# Patient Record
Sex: FEMALE | Race: WHITE
[De-identification: names, ages, dates, MRNs, and addresses within clinical notes are randomized per-mention and may not be internally consistent; named-entity substitution may affect disease eponyms.]

---

## 2018-12-23 ENCOUNTER — HOSPITAL ENCOUNTER (EMERGENCY)
Dept: HOSPITAL 91 - FTE | Age: 43
LOS: 1 days | Discharge: HOME | End: 2018-12-24
Payer: SELF-PAY

## 2018-12-23 ENCOUNTER — HOSPITAL ENCOUNTER (EMERGENCY)
Age: 43
LOS: 1 days | Discharge: HOME | End: 2018-12-24

## 2018-12-23 DIAGNOSIS — N30.00: Primary | ICD-10-CM

## 2018-12-23 PROCEDURE — 83605 ASSAY OF LACTIC ACID: CPT

## 2018-12-23 PROCEDURE — 81025 URINE PREGNANCY TEST: CPT

## 2018-12-23 PROCEDURE — 81001 URINALYSIS AUTO W/SCOPE: CPT

## 2018-12-23 PROCEDURE — 99284 EMERGENCY DEPT VISIT MOD MDM: CPT

## 2018-12-23 PROCEDURE — 96374 THER/PROPH/DIAG INJ IV PUSH: CPT

## 2018-12-23 PROCEDURE — 85025 COMPLETE CBC W/AUTO DIFF WBC: CPT

## 2018-12-23 PROCEDURE — 36415 COLL VENOUS BLD VENIPUNCTURE: CPT

## 2018-12-23 PROCEDURE — 87086 URINE CULTURE/COLONY COUNT: CPT

## 2018-12-23 PROCEDURE — 80053 COMPREHEN METABOLIC PANEL: CPT

## 2018-12-23 PROCEDURE — 87040 BLOOD CULTURE FOR BACTERIA: CPT

## 2018-12-23 PROCEDURE — 96375 TX/PRO/DX INJ NEW DRUG ADDON: CPT

## 2018-12-23 RX ADMIN — THIAMINE HYDROCHLORIDE 1 ML: 100 INJECTION, SOLUTION INTRAMUSCULAR; INTRAVENOUS at 23:57

## 2018-12-23 RX ADMIN — ACETAMINOPHEN 1 MG: 325 TABLET, FILM COATED ORAL at 23:57

## 2018-12-24 ENCOUNTER — HOSPITAL ENCOUNTER (INPATIENT)
Dept: HOSPITAL 91 - FTE | Age: 43
LOS: 5 days | Discharge: HOME | DRG: 872 | End: 2018-12-29
Payer: MEDICAID

## 2018-12-24 ENCOUNTER — HOSPITAL ENCOUNTER (INPATIENT)
Dept: HOSPITAL 10 - FTE | Age: 43
LOS: 5 days | Discharge: HOME | DRG: 872 | End: 2018-12-29
Attending: INTERNAL MEDICINE
Payer: MEDICAID

## 2018-12-24 VITALS — SYSTOLIC BLOOD PRESSURE: 100 MMHG | DIASTOLIC BLOOD PRESSURE: 55 MMHG | RESPIRATION RATE: 18 BRPM

## 2018-12-24 VITALS
HEIGHT: 64 IN | WEIGHT: 207.68 LBS | BODY MASS INDEX: 35.45 KG/M2 | HEIGHT: 64 IN | BODY MASS INDEX: 35.45 KG/M2 | WEIGHT: 207.68 LBS

## 2018-12-24 DIAGNOSIS — E87.6: ICD-10-CM

## 2018-12-24 DIAGNOSIS — J01.90: ICD-10-CM

## 2018-12-24 DIAGNOSIS — E86.0: ICD-10-CM

## 2018-12-24 DIAGNOSIS — A41.50: Primary | ICD-10-CM

## 2018-12-24 DIAGNOSIS — E66.9: ICD-10-CM

## 2018-12-24 DIAGNOSIS — R51: ICD-10-CM

## 2018-12-24 DIAGNOSIS — R65.20: ICD-10-CM

## 2018-12-24 DIAGNOSIS — K81.0: ICD-10-CM

## 2018-12-24 DIAGNOSIS — N10: ICD-10-CM

## 2018-12-24 LAB
ADD MAN DIFF?: NO
ADD MAN DIFF?: NO
ADD UMIC: YES
ALANINE AMINOTRANSFERASE: 25 IU/L (ref 13–69)
ALANINE AMINOTRANSFERASE: 29 IU/L (ref 13–69)
ALBUMIN/GLOBULIN RATIO: 1.02
ALBUMIN/GLOBULIN RATIO: 1.4
ALBUMIN: 3.5 G/DL (ref 3.3–4.9)
ALBUMIN: 4.2 G/DL (ref 3.3–4.9)
ALKALINE PHOSPHATASE: 101 IU/L (ref 42–121)
ALKALINE PHOSPHATASE: 80 IU/L (ref 42–121)
ANION GAP: 13 (ref 5–13)
ANION GAP: 9 (ref 5–13)
ASPARTATE AMINO TRANSFERASE: 28 IU/L (ref 15–46)
ASPARTATE AMINO TRANSFERASE: 30 IU/L (ref 15–46)
BASOPHIL #: 0 10^3/UL (ref 0–0.1)
BASOPHIL #: 0 10^3/UL (ref 0–0.1)
BASOPHILS %: 0.1 % (ref 0–2)
BASOPHILS %: 0.2 % (ref 0–2)
BILIRUBIN,DIRECT: 0 MG/DL (ref 0–0.2)
BILIRUBIN,DIRECT: 0 MG/DL (ref 0–0.2)
BILIRUBIN,TOTAL: 0.6 MG/DL (ref 0.2–1.3)
BILIRUBIN,TOTAL: 0.7 MG/DL (ref 0.2–1.3)
BLOOD UREA NITROGEN: 12 MG/DL (ref 7–20)
BLOOD UREA NITROGEN: 6 MG/DL (ref 7–20)
CALCIUM: 8.5 MG/DL (ref 8.4–10.2)
CALCIUM: 9.1 MG/DL (ref 8.4–10.2)
CARBON DIOXIDE: 28 MMOL/L (ref 21–31)
CARBON DIOXIDE: 28 MMOL/L (ref 21–31)
CHLORIDE: 104 MMOL/L (ref 97–110)
CHLORIDE: 97 MMOL/L (ref 97–110)
CREATININE: 0.63 MG/DL (ref 0.44–1)
CREATININE: 0.65 MG/DL (ref 0.44–1)
EOSINOPHILS #: 0 10^3/UL (ref 0–0.5)
EOSINOPHILS #: 0 10^3/UL (ref 0–0.5)
EOSINOPHILS %: 0.2 % (ref 0–7)
EOSINOPHILS %: 0.3 % (ref 0–7)
GLOBULIN: 3 G/DL (ref 1.3–3.2)
GLOBULIN: 3.4 G/DL (ref 1.3–3.2)
GLUCOSE: 147 MG/DL (ref 70–220)
GLUCOSE: 170 MG/DL (ref 70–220)
HEMATOCRIT: 32.7 % (ref 37–47)
HEMATOCRIT: 35.1 % (ref 37–47)
HEMOGLOBIN: 10.8 G/DL (ref 12–16)
HEMOGLOBIN: 12.1 G/DL (ref 12–16)
IMMATURE GRANS #M: 0.04 10^3/UL (ref 0–0.03)
IMMATURE GRANS #M: 0.08 10^3/UL (ref 0–0.03)
IMMATURE GRANS % (M): 0.4 % (ref 0–0.43)
IMMATURE GRANS % (M): 0.7 % (ref 0–0.43)
LIPASE: 10 U/L (ref 23–300)
LYMPHOCYTES #: 0.7 10^3/UL (ref 0.8–2.9)
LYMPHOCYTES #: 1 10^3/UL (ref 0.8–2.9)
LYMPHOCYTES %: 10.3 % (ref 15–51)
LYMPHOCYTES %: 6.7 % (ref 15–51)
MEAN CORPUSCULAR HEMOGLOBIN: 30.1 PG (ref 29–33)
MEAN CORPUSCULAR HEMOGLOBIN: 30.3 PG (ref 29–33)
MEAN CORPUSCULAR HGB CONC: 33 G/DL (ref 32–37)
MEAN CORPUSCULAR HGB CONC: 34.5 G/DL (ref 32–37)
MEAN CORPUSCULAR VOLUME: 87.8 FL (ref 82–101)
MEAN CORPUSCULAR VOLUME: 91.1 FL (ref 82–101)
MEAN PLATELET VOLUME: 10.5 FL (ref 7.4–10.4)
MEAN PLATELET VOLUME: 9.9 FL (ref 7.4–10.4)
MONOCYTE #: 0.3 10^3/UL (ref 0.3–0.9)
MONOCYTE #: 0.6 10^3/UL (ref 0.3–0.9)
MONOCYTES %: 3.7 % (ref 0–11)
MONOCYTES %: 5.6 % (ref 0–11)
NEUTROPHIL #: 7.9 10^3/UL (ref 1.6–7.5)
NEUTROPHIL #: 9.4 10^3/UL (ref 1.6–7.5)
NEUTROPHILS %: 85.2 % (ref 39–77)
NEUTROPHILS %: 86.6 % (ref 39–77)
NUCLEATED RED BLOOD CELLS #: 0 10^3/UL (ref 0–0)
NUCLEATED RED BLOOD CELLS #: 0 10^3/UL (ref 0–0)
NUCLEATED RED BLOOD CELLS%: 0 /100WBC (ref 0–0)
NUCLEATED RED BLOOD CELLS%: 0 /100WBC (ref 0–0)
PLATELET COUNT: 131 10^3/UL (ref 140–415)
PLATELET COUNT: 140 10^3/UL (ref 140–415)
POTASSIUM: 3.2 MMOL/L (ref 3.5–5.1)
POTASSIUM: 3.5 MMOL/L (ref 3.5–5.1)
RED BLOOD COUNT: 3.59 10^6/UL (ref 4.2–5.4)
RED BLOOD COUNT: 4 10^6/UL (ref 4.2–5.4)
RED CELL DISTRIBUTION WIDTH: 12.2 % (ref 11.5–14.5)
RED CELL DISTRIBUTION WIDTH: 12.8 % (ref 11.5–14.5)
SODIUM: 138 MMOL/L (ref 135–144)
SODIUM: 141 MMOL/L (ref 135–144)
TOTAL PROTEIN: 6.9 G/DL (ref 6.1–8.1)
TOTAL PROTEIN: 7.2 G/DL (ref 6.1–8.1)
UR ASCORBIC ACID: NEGATIVE MG/DL
UR BACTERIA: (no result) /HPF
UR BILIRUBIN (DIP): NEGATIVE MG/DL
UR BLOOD (DIP): (no result) MG/DL
UR CLARITY: (no result)
UR COLOR: YELLOW
UR GLUCOSE (DIP): NEGATIVE MG/DL
UR KETONES (DIP): (no result) MG/DL
UR LEUKOCYTE ESTERASE (DIP): (no result) LEU/UL
UR NITRITE (DIP): NEGATIVE MG/DL
UR NONSQUAMOUS EPITHELIAL CELL: 2 /HPF
UR PH (DIP): 7 (ref 5–9)
UR RBC: 13 /HPF (ref 0–5)
UR SPECIFIC GRAVITY (DIP): 1.01 (ref 1–1.03)
UR SQUAMOUS EPITHELIAL CELL: (no result) /HPF
UR TOTAL PROTEIN (DIP): (no result) MG/DL
UR UROBILINOGEN (DIP): (no result) MG/DL
UR WBC: 98 /HPF (ref 0–5)
WHITE BLOOD COUNT: 10.8 10^3/UL (ref 4.8–10.8)
WHITE BLOOD COUNT: 9.3 10^3/UL (ref 4.8–10.8)

## 2018-12-24 PROCEDURE — 90686 IIV4 VACC NO PRSV 0.5 ML IM: CPT

## 2018-12-24 PROCEDURE — 80069 RENAL FUNCTION PANEL: CPT

## 2018-12-24 PROCEDURE — 87086 URINE CULTURE/COLONY COUNT: CPT

## 2018-12-24 PROCEDURE — 84479 ASSAY OF THYROID (T3 OR T4): CPT

## 2018-12-24 PROCEDURE — 80061 LIPID PANEL: CPT

## 2018-12-24 PROCEDURE — 96375 TX/PRO/DX INJ NEW DRUG ADDON: CPT

## 2018-12-24 PROCEDURE — 81001 URINALYSIS AUTO W/SCOPE: CPT

## 2018-12-24 PROCEDURE — 83690 ASSAY OF LIPASE: CPT

## 2018-12-24 PROCEDURE — 96374 THER/PROPH/DIAG INJ IV PUSH: CPT

## 2018-12-24 PROCEDURE — 84436 ASSAY OF TOTAL THYROXINE: CPT

## 2018-12-24 PROCEDURE — A9537 TC99M MEBROFENIN: HCPCS

## 2018-12-24 PROCEDURE — 87040 BLOOD CULTURE FOR BACTERIA: CPT

## 2018-12-24 PROCEDURE — 71045 X-RAY EXAM CHEST 1 VIEW: CPT

## 2018-12-24 PROCEDURE — 85025 COMPLETE CBC W/AUTO DIFF WBC: CPT

## 2018-12-24 PROCEDURE — 83735 ASSAY OF MAGNESIUM: CPT

## 2018-12-24 PROCEDURE — 80053 COMPREHEN METABOLIC PANEL: CPT

## 2018-12-24 PROCEDURE — 99285 EMERGENCY DEPT VISIT HI MDM: CPT

## 2018-12-24 PROCEDURE — 78226 HEPATOBILIARY SYSTEM IMAGING: CPT

## 2018-12-24 PROCEDURE — 70470 CT HEAD/BRAIN W/O & W/DYE: CPT

## 2018-12-24 PROCEDURE — 84443 ASSAY THYROID STIM HORMONE: CPT

## 2018-12-24 PROCEDURE — 82977 ASSAY OF GGT: CPT

## 2018-12-24 PROCEDURE — 83036 HEMOGLOBIN GLYCOSYLATED A1C: CPT

## 2018-12-24 PROCEDURE — 76705 ECHO EXAM OF ABDOMEN: CPT

## 2018-12-24 PROCEDURE — G0378 HOSPITAL OBSERVATION PER HR: HCPCS

## 2018-12-24 PROCEDURE — 74176 CT ABD & PELVIS W/O CONTRAST: CPT

## 2018-12-24 RX ADMIN — ONDANSETRON PRN MG: 4 TABLET, FILM COATED ORAL at 20:57

## 2018-12-24 RX ADMIN — THIAMINE HYDROCHLORIDE 1 MLS/HR: 100 INJECTION, SOLUTION INTRAMUSCULAR; INTRAVENOUS at 20:58

## 2018-12-24 RX ADMIN — CEFTRIAXONE 1 MLS/HR: 1 INJECTION, SOLUTION INTRAVENOUS at 01:15

## 2018-12-24 RX ADMIN — POTASSIUM CHLORIDE 1 MEQ: 1500 TABLET, EXTENDED RELEASE ORAL at 01:15

## 2018-12-24 RX ADMIN — CEFTRIAXONE 1 MLS/HR: 1 INJECTION, SOLUTION INTRAVENOUS at 18:17

## 2018-12-24 RX ADMIN — ONDANSETRON HYDROCHLORIDE 1 MG: 4 TABLET, FILM COATED ORAL at 20:57

## 2018-12-24 RX ADMIN — ONDANSETRON HYDROCHLORIDE 1 MG: 2 INJECTION, SOLUTION INTRAMUSCULAR; INTRAVENOUS at 03:00

## 2018-12-24 RX ADMIN — ONDANSETRON HYDROCHLORIDE 1 MG: 2 INJECTION, SOLUTION INTRAMUSCULAR; INTRAVENOUS at 18:17

## 2018-12-24 RX ADMIN — FOLIC ACID SCH MLS/HR: 5 INJECTION, SOLUTION INTRAMUSCULAR; INTRAVENOUS; SUBCUTANEOUS at 20:58

## 2018-12-24 RX ADMIN — HYDROCODONE BITARTRATE AND ACETAMINOPHEN 1 TAB: 5; 325 TABLET ORAL at 20:57

## 2018-12-24 RX ADMIN — KETOROLAC TROMETHAMINE 1 MG: 30 INJECTION, SOLUTION INTRAMUSCULAR at 01:36

## 2018-12-24 RX ADMIN — THIAMINE HYDROCHLORIDE 1 MLS/HR: 100 INJECTION, SOLUTION INTRAMUSCULAR; INTRAVENOUS at 18:17

## 2018-12-24 RX ADMIN — HYDROCODONE BITARTRATE AND ACETAMINOPHEN PRN TAB: 5; 325 TABLET ORAL at 20:57

## 2018-12-24 NOTE — ERD
ER Documentation


Chief Complaint


Chief Complaint





Pt called by [facility] for lab results, seen yesterday





HPI


43-year-old female history of remote appendectomy was evaluated in the ED 


earlier this morning diagnosed with pyelonephritis treated with Rocephin and 


discharged on ciprofloxacin called back today for evaluation of blood cultures 


which were positive for gram-negative rods.  Patient complains of ongoing 


fevers, right flank pain and mild, generalized headache.  Denies neck or back 


pain.  Denies dysuria, polyuria or hematuria.  Denies chest pain, palpitations, 


cough or shortness of breath.  No relieving or exacerbating factors.





ROS


All systems reviewed and are negative except as per history of present illness.





Medications


Home Meds


Active Scripts


Hydrocodone Bit-Acetaminophen (Hydrocodone Bit-APAP) 5-325MG Tablet, 1 TAB PO 


Q6H PRN for MODERATE PAIN LEVEL 4-6 for 7 Days, #28 TAB


   Prov:MITCHELL KRAUSE MD         18


Simethicone (GAS RELIEF) 80 Mg Tab.chew, 80 MG PO Q6 PRN for 


DISTENSION/GAS/BLOATING for 30 Days, #120 TAB.CHEW


   Prov:MITCHELL KRAUSE MD         18


Ondansetron (Ondansetron Odt) 4 Mg Tab.rapdis, 4 MG PO Q6H PRN for NAUSEA AND/OR


VOMITING for 7 Days, #30 TAB


   Prov:MITCHELL KRAUSE MD         18


Fluticasone Propionate* (Fluticasone Propionate* Nasal) 50 Mcg/Spray - 16 Gm 


[place].susp, 1 SPRAY NASAL BID for 14 Days, #1 BOT 6 Refills


   Prov:MITCHELL KRAUSE MD         18


Loratadine/Pseudoephedrine (Alavert D-12 Allergy-Sinus Tab) 1 Each Tab.er.12h, 1


TAB PO Q12 for 7 Days, #14 TAB


   Prov:MITCHELL KRAUSE MD         18


Ciprofloxacin Hcl* (Ciprofloxacin Hcl*) 500 Mg Tablet, 500 MG PO BID for 10 


Days, #20 TAB


   Prov:MITCHELL KRAUSE MD         18


Ibuprofen* (Motrin*) 600 Mg Tab, 600 MG PO Q6, #20 TAB


   Prov:ERICA KAUR MD         18


Discontinued Scripts


Hydrocodone Bit/Acetaminophen (Anexsia 5-325 Mg Tablet) 1 Tab Tablet, 2 TAB PO 


Q4 PRN for PAIN LEVEL 4-7, #20 TAB


   Prov:LEONARDO SAL NP         6/3/16





Allergies


Allergies:  


Coded Allergies:  


     No Known Allergy (Unverified , 18)





PMhx/Soc


Reviewed in chart. As per HPI.


History of Surgery:  Yes (just had laparoscopic appendectomy)


Anesthesia Reaction:  No


Hx Neurological Disorder:  No


Hx Respiratory Disorders:  No


Hx Cardiac Disorders:  No


Hx Psychiatric Problems:  No


Hx Miscellaneous Medical Probl:  No


Hx Alcohol Use:  No


Hx Substance Use:  No


Hx Tobacco Use:  No





FmHx


No family history relevant to presenting complaint





Physical Exam


Vitals


Temperature: 98.7.  Pulse: 98.  Respirations: 18.  Blood pressure: 124/73.  O2 


saturation 97% on room air.


Physical Exam


Const:   Alert, moderate distress


Head:   Atraumatic 


Eyes:    Normal Conjunctiva


ENT:    Normal External Ears, Nose and Mouth.


Neck:               Full range of motion. No meningismus.


Resp:   Clear to auscultation bilaterally


Cardio:   Regular rate and rhythm, no murmurs


Abd:    Soft, non tender, non distended.  No rebound or guarding.  Normal bowel 


sounds


Skin:   No petechiae or rashes


Back:   Right CVA tenderness.


Ext:    No cyanosis, or edema


Neur:   Awake and alert


Psych:    Normal Mood and Affect


Result Diagram:  


18 0616                                                                   


            18 0616





Results 24 hrs





Laboratory Tests


              Test
                                18
18:17


              White Blood Count                      9.3 10^3/ul


              Red Blood Count                       3.59 10^6/ul


              Hemoglobin                               10.8 g/dl


              Hematocrit                                  32.7 %


              Mean Corpuscular Volume                    91.1 fl


              Mean Corpuscular Hemoglobin                30.1 pg


              Mean Corpuscular Hemoglobin
Concent     33.0 g/dl 



              Red Cell Distribution Width                 12.8 %


              Platelet Count                         131 10^3/UL


              Mean Platelet Volume                        9.9 fl


              Immature Granulocytes %                    0.400 %


              Neutrophils %                               85.2 %


              Lymphocytes %                               10.3 %


              Monocytes %                                  3.7 %


              Eosinophils %                                0.3 %


              Basophils %                                  0.1 %


              Nucleated Red Blood Cells %            0.0 /100WBC


              Immature Granulocytes #              0.040 10^3/ul


              Neutrophils #                          7.9 10^3/ul


              Lymphocytes #                          1.0 10^3/ul


              Monocytes #                            0.3 10^3/ul


              Eosinophils #                          0.0 10^3/ul


              Basophils #                            0.0 10^3/ul


              Nucleated Red Blood Cells #            0.0 10^3/ul


              Sodium Level                            141 mmol/L


              Potassium Level                         3.5 mmol/L


              Chloride Level                          104 mmol/L


              Carbon Dioxide Level                     28 mmol/L


              Anion Gap                                        9


              Blood Urea Nitrogen                        6 mg/dl


              Creatinine                              0.63 mg/dl


              Est Glomerular Filtrat Rate
mL/min   > 60 mL/min 



              Glucose Level                            147 mg/dl


              Calcium Level                            8.5 mg/dl


              Total Bilirubin                          0.7 mg/dl


              Direct Bilirubin                        0.00 mg/dl


              Indirect Bilirubin                       0.7 mg/dl


              Aspartate Amino Transf
(AST/SGOT)         30 IU/L 



              Alanine Aminotransferase
(ALT/SGPT)       29 IU/L 



              Alkaline Phosphatase                       80 IU/L


              Total Protein                             6.9 g/dl


              Albumin                                   3.5 g/dl


              Globulin                                 3.40 g/dl


              Albumin/Globulin Ratio                        1.02


              Lipase                                      10 U/L





Current Medications


 Medications
   Dose
          Sig/Kathryn
       Start Time
   Status  Last


 (Trade)       Ordered        Route
 PRN     Stop Time              Admin
Dose


                              Reason                                Admin


 Sodium         1,000 ml @ 
   Q1H ONCE
      18      DC          18


Chloride       1,000 mls/hr   IV
            18:30
                       18:17



                                             18


                                             19:29


 Ceftriaxone    50 ml @ 
      ONCE  ONCE
    18      DC          18


Sodium         100 mls/hr     IVPB
          18:30
                       18:17



                                             18


                                             18:59


 Ondansetron    4 mg           ONCE  STAT
    18      DC          18


HCl
  (Zofran                 IV
            18:10
                       18:17



Inj)                                         18


                                             18:12


 Morphine       4 mg           ONCE  STAT
    18      DC          18


Sulfate
                      IV
            19:08
                       19:13



(morphine)                                   18


                                             19:09


 Sodium         1,000 ml @ 
   Q10H
 IV
      18      DC          18


Chloride       100 mls/hr                    19:39
                       04:38



                                             18


                                             15:38








Procedures/MDM


DOCUMENTS REVIEWED:  ED nurse, prior ED








MEDICAL DECISION MAKIN-year-old female history of remote appendectomy was 


evaluated in the ED earlier this morning diagnosed with pyelonephritis treated 


with Rocephin and discharged on ciprofloxacin called back today for evaluation 


of blood cultures which were positive for gram-negative rods.  CBC consistent 


with previously diagnosed anemia but no leukocytosis.  Chemistry reveals no 


renal insufficiency or electrolyte abnormalities.  Liver function tests are 


unremarkable.  Urinalysis reveals 11 WBCs per high-power field and 1+ leukocytes


but negative nitrite.  Patient with gram-negative bacteremia likely secondary to


pyelonephritis although considering her ongoing symptoms an occult intra-


abdominal etiology is also possible including cholecystitis and further imaging 


should be considered.  No respiratory symptoms or signs of pneumonia.  Gradual 


onset headache not consistent with subarachnoid hemorrhage, meningitis or 


encephalitis hence neuroimaging and lumbar puncture not indicated.  Admit to 


[facility] for intravenous antibiotics, further evaluation and management.











PATIENT CARE TRANSITIONED: Time: 19:20, Dr. Dueñas. 








Counseled patient and family regarding diagnosis, diagnostic results and plan 


for admission.





Departure


Diagnosis:  


   Primary Impression:  


   Gram-negative bacteremia


   Additional Impressions:  


   Pyelonephritis


   Headache


   Headache type:  unspecified  Headache chronicity pattern:  acute headache  


   Intractability:  not intractable  Qualified Codes:  R51 - Headache


Condition:  Serious











MARCOS GRIGSBY MD           Dec 24, 2018 19:20

## 2018-12-24 NOTE — HP
Date/Time of Note


Date/Time of Note


DATE: 12/24/18 


TIME: 19:52





Assessment/Plan


VTE Prophylaxis


SCD applied (from Nsg):  Yes


Pharmacological prophylaxis:  NA/contraindicated


Pharm contraindication:  low risk/ambulating





Lines/Catheters


IV Catheter Type (from Nrsg):  Saline Lock





Assessment/Plan


Hospital Course


This is a 43-year-old female being admitted to the [facility] floor for:





#1 right-sided pyelonephritis: Patient is CVA tenderness palpation.  At the 


current time she was also found to have gram-negative bacteremia she was given 


ceftriaxone IV in the ED at the current time I will continue her on ceftriaxone 


2 g IV every 24 hours.  Will await urine culture results.  Dilaudid for pain.  


Given the degree of the patient's pain and on and off duration for the last few 


months I will also order a CT of the abdomen pelvis without contrast to assess 


for any other underlying abnormalities such as possible kidney stone.





#2 gram-negative bacteremia: Currently on ceftriaxone, await culture results.





#3  Obesity: We will check hemoglobin A1c, lipid panel, TSH





#4 DVT GI prophylaxis: SCDs, no GI prophylaxis indicated





Further treatment strategy will be implemented as per the clinical course


Result Diagram:  


12/24/18 1817                                                                   


            12/24/18 1817





Results 24hrs





Laboratory Tests


              Test
                                12/24/18
18:17


              White Blood Count                             9.3


              Red Blood Count                             3.59  L


              Hemoglobin                                  10.8  L


              Hematocrit                                  32.7  L


              Mean Corpuscular Volume                      91.1


              Mean Corpuscular Hemoglobin                  30.1


              Mean Corpuscular Hemoglobin
Concent         33.0  



              Red Cell Distribution Width                  12.8


              Platelet Count                               131  L


              Mean Platelet Volume                          9.9


              Immature Granulocytes %                     0.400


              Neutrophils %                               85.2  H


              Lymphocytes %                               10.3  L


              Monocytes %                                   3.7


              Eosinophils %                                 0.3


              Basophils %                                   0.1


              Nucleated Red Blood Cells %                   0.0


              Immature Granulocytes #                    0.040  H


              Neutrophils #                                7.9  H


              Lymphocytes #                                 1.0


              Monocytes #                                   0.3


              Eosinophils #                                 0.0


              Basophils #                                   0.0


              Nucleated Red Blood Cells #                   0.0


              Sodium Level                                  141


              Potassium Level                               3.5


              Chloride Level                                104


              Carbon Dioxide Level                           28


              Anion Gap                                       9


              Blood Urea Nitrogen                            6  L


              Creatinine                                   0.63


              Est Glomerular Filtrat Rate
mL/min   > 60  



              Glucose Level                                 147


              Calcium Level                                 8.5


              Total Bilirubin                               0.7


              Direct Bilirubin                             0.00


              Indirect Bilirubin                            0.7


              Aspartate Amino Transf
(AST/SGOT)             30  



              Alanine Aminotransferase
(ALT/SGPT)           29  



              Alkaline Phosphatase                           80


              Total Protein                                 6.9


              Albumin                                       3.5


              Globulin                                    3.40  H


              Albumin/Globulin Ratio                       1.02


              Lipase                                        10  L








HPI/ROS


Admit Date/Time


Admit Date/Time








Hx of Present Illness


Chief complaint: Fever, flank pain, positive blood culture





This is a 43 year old female who was diagnosed with the last 24 hours of urinary


tract infection and was given IV Rocephin and sent home on ciprofloxacin.  


Patient was called back to [facility] after she had a positive


blood culture with gram-negative rods.  Patient at the current time reports 


headache as well as fevers.  She states that she continues to have right flank 


pain.  She denies any chest pain.  She does report urinary frequency.  Upon 


further questioning the patient does report that her right-sided flank pain has 


been on and off for the last few months.





Allergies: NKDA





Medications: None





ROS


Const: As per HPI


Eyes : No pain discharge or redness or change in visual acuity


ENT: No pain, sore throat, congestion, congestion,  dysphagia or discharge


Respiratory: No shortness of breath, cough, sputum, wheezing, or pleuritic pain


Cardiovascular: No chest pain, palpitation, PND, or edema


GI : no change in appetite, abdominal pain, nausea, vomiting, diarrhea, 


constipation, or change in the color his stool 


Genitourinary: As per HPI


Musculoskeletal: No joint pain, back pain, neck pain, restricted range of motion


in neck or joints


Skin: No rash, bruising or hives 


Neuro: No headache, dizziness, syncope, seizure, focal weakness


Endocrine: No polyuria, polydipsia, temperature intolerance


Psych: No hallucination, depression, anxiety or suicidal ideation





PMH/Family/Social


Past Medical History


Medical History:  no pertinent history


Medications





Current Medications


Sodium Chloride 1,000 ml @  100 mls/hr Q10H IV ;  Start 12/24/18 at 19:39;  Stop


12/25/18 at 15:38;  Status UNV


IV Flush (NS 3 ml) 3 ml PER PROTOCOL IV ;  Start 12/24/18 at 20:00;  Status UNV


Ondansetron HCl (Zofran Tab) 4 mg Q6H  PRN PO NAUSEA AND/OR VOMITING;  Start 


12/24/18 at 20:00;  Status UNV


Acetaminophen (Tylenol Tab) 650 mg Q6H  PRN PO PAIN LEVEL 1-3 OR FEVER;  Start 


12/24/18 at 20:00;  Status UNV


Acetaminophen/ Hydrocodone Bitart (Norco (5/325)) 1 tab Q6H  PRN PO MODERATE 


PAIN LEVEL 4-6;  Start 12/24/18 at 20:00;  Status UNV


Docusate Sodium (Colace) 100 mg Q12H  PRN PO CONSTIPATION;  Start 12/24/18 at 20


:00;  Status UNV


Bisacodyl (Dulcolax) 5 mg DAILY  PRN PO CONSTIPATION;  Start 12/24/18 at 20:00; 


Status UNV


Coded Allergies:  


     No Known Allergy (Unverified , 12/24/18)





Past Surgical History


Appendectomy?


Past Surgical Hx:  other





Family History


Significant Family History:  no pertinent family hx





Social History


Alcohol Use:  none





Exam/Review of Systems


Vital Signs


Vitals





Vital Signs


  Date      Temp  Pulse  Resp  B/P (MAP)   Pulse Ox  O2          O2 Flow    FiO2


Time                                                 Delivery    Rate


  12/24/18  98.7     98    18      124/73        97


     17:14                           (90)








Exam


Exam





General: Patient is a pleasant female currently lying in bed in moderate 


distress from flank pain 


HEENT: Atraumatic, normocephalic. The pupils are equal, round and reactive. 


Extraocular motor are intact


Neck: Supple with full range of motion. No rigidity or meningismus


Chest: Nontender


Lungs: Clear to auscultation bilaterally no crackles rales or wheezing


Heart: Normal S1-S2, Regular rhythm and rate. No murmur, S3, or S4


Abdomen: ight-sided flank tender to palpation,Soft , nontender,  nondistended , 


bowel sounds are present. No guarding no rebound tenderness , r


Extremities: Normal to inspection, no edema no cyanosis


Neurologic: Normal mental status, speech normal, cranial nerves II through XII 


are intact, motor and sensory are intact, no focal weakness


Additional Comments


PROCEDURE:   Portable chest x-ray. 


 


CLINICAL INDICATION: 43 years of age,  female. Right flank pain. UTI.


 


TECHNIQUE:   Portable AP view of the chest. 


 


COMPARISON:  CT abdomen pelvis June 2, 2016


 


FINDINGS:


 


Medical devices:  None.


 


Mediastinum:  Cardiomediastinal contours are normal.  


 


Lungs:  There is mild elevation of the right diaphragm that is unchanged from 


the prior CT. There is nonspecific mild increased opacity at the right greater 


than left lung bases. Lungs are otherwise clear.


 


Pleura:  Negative for pleural effusion or pneumothorax.


 


Bones:  No acute bony abnormality.   


 


Additional comment:  None.


 


IMPRESSION:


 


1. Mild elevation of the right diaphragm is unchanged since June 2, 2016. 


 


2. Nonspecific opacity at the right greater than left lung bases may be due to 


atelectasis, aspiration or pneumonia.


 


 


RPTAT: HCTS


_____________________________________________ 


Konrad Murillo Physician           Date    Time 


Electronically viewed and signed by Konrad Murillo Physician on 12/25/2018 


01:25 


 


D:  12/25/2018 01:25  T:  12/25/2018 01:25


CS/





CC: DALI GOTTI





[phone]











DALI GOTTI                 Dec 24, 2018 19:52

## 2018-12-24 NOTE — NUR
admitted patient from ER with daughter as . alert oriented x 4. oriented pt with 
room, phone and call button. also oriented pt with hourly rounding and encouraged to call 
for help at all times. skin assessment done with photos take. admission orders placed in by 
Dr Dueñas. noted and carried out. medicated with [place] for pain with help. will continue to 
monitor.

## 2018-12-25 VITALS — SYSTOLIC BLOOD PRESSURE: 119 MMHG | HEART RATE: 96 BPM | RESPIRATION RATE: 18 BRPM | DIASTOLIC BLOOD PRESSURE: 68 MMHG

## 2018-12-25 VITALS — SYSTOLIC BLOOD PRESSURE: 98 MMHG | DIASTOLIC BLOOD PRESSURE: 56 MMHG | RESPIRATION RATE: 18 BRPM | HEART RATE: 83 BPM

## 2018-12-25 VITALS — HEART RATE: 63 BPM | SYSTOLIC BLOOD PRESSURE: 117 MMHG | DIASTOLIC BLOOD PRESSURE: 65 MMHG | RESPIRATION RATE: 20 BRPM

## 2018-12-25 VITALS — SYSTOLIC BLOOD PRESSURE: 104 MMHG | DIASTOLIC BLOOD PRESSURE: 65 MMHG | RESPIRATION RATE: 18 BRPM | HEART RATE: 93 BPM

## 2018-12-25 LAB
ADD MAN DIFF?: NO
ADD UMIC: YES
ALANINE AMINOTRANSFERASE: 28 IU/L (ref 13–69)
ALBUMIN/GLOBULIN RATIO: 1.16
ALBUMIN: 2.8 G/DL (ref 3.3–4.9)
ALKALINE PHOSPHATASE: 61 IU/L (ref 42–121)
ANION GAP: 11 (ref 5–13)
ASPARTATE AMINO TRANSFERASE: 18 IU/L (ref 15–46)
BASOPHIL #: 0 10^3/UL (ref 0–0.1)
BASOPHILS %: 0.1 % (ref 0–2)
BILIRUBIN,DIRECT: 0 MG/DL (ref 0–0.2)
BILIRUBIN,TOTAL: 0.4 MG/DL (ref 0.2–1.3)
BLOOD UREA NITROGEN: 5 MG/DL (ref 7–20)
CALCIUM: 7.9 MG/DL (ref 8.4–10.2)
CARBON DIOXIDE: 27 MMOL/L (ref 21–31)
CHLORIDE: 100 MMOL/L (ref 97–110)
CHOL/HDL RATIO: 4.7 RATIO
CHOLESTEROL: 134 MG/DL (ref 100–200)
CREATININE: 0.65 MG/DL (ref 0.44–1)
EOSINOPHILS #: 0 10^3/UL (ref 0–0.5)
EOSINOPHILS %: 0.4 % (ref 0–7)
FREE THYROXINE INDEX (CALC): 2.28 UG/ML (ref 0.65–3.89)
GAMMA GLUTAMYL TRANSPEPTIDASE: 51 IU/L (ref 0–50)
GLOBULIN: 2.4 G/DL (ref 1.3–3.2)
GLUCOSE: 116 MG/DL (ref 70–220)
HDL CHOLESTEROL: 28 MG/DL (ref 34–88)
HEMATOCRIT: 28.9 % (ref 37–47)
HEMOGLOBIN A1C: 5.7 % (ref 0–5.9)
HEMOGLOBIN: 9.4 G/DL (ref 12–16)
IMMATURE GRANS #M: 0.03 10^3/UL (ref 0–0.03)
IMMATURE GRANS % (M): 0.4 % (ref 0–0.43)
LDL CHOLESTEROL,CALCULATED: 75 MG/DL
LYMPHOCYTES #: 0.9 10^3/UL (ref 0.8–2.9)
LYMPHOCYTES %: 12.5 % (ref 15–51)
MAGNESIUM: 2 MG/DL (ref 1.7–2.5)
MEAN CORPUSCULAR HEMOGLOBIN: 29.9 PG (ref 29–33)
MEAN CORPUSCULAR HGB CONC: 32.5 G/DL (ref 32–37)
MEAN CORPUSCULAR VOLUME: 92 FL (ref 82–101)
MEAN PLATELET VOLUME: 10.6 FL (ref 7.4–10.4)
MONOCYTE #: 0.5 10^3/UL (ref 0.3–0.9)
MONOCYTES %: 6.5 % (ref 0–11)
NEUTROPHIL #: 5.8 10^3/UL (ref 1.6–7.5)
NEUTROPHILS %: 80.1 % (ref 39–77)
NUCLEATED RED BLOOD CELLS #: 0 10^3/UL (ref 0–0)
NUCLEATED RED BLOOD CELLS%: 0 /100WBC (ref 0–0)
PLATELET COUNT: 117 10^3/UL (ref 140–415)
POTASSIUM: 3.4 MMOL/L (ref 3.5–5.1)
RED BLOOD COUNT: 3.14 10^6/UL (ref 4.2–5.4)
RED CELL DISTRIBUTION WIDTH: 13.1 % (ref 11.5–14.5)
SODIUM: 138 MMOL/L (ref 135–144)
T3 UPTAKE: 35.6 % (ref 23.5–40.5)
T4 (THYROXINE): 6.4 UG/DL (ref 5.5–11)
THYROID STIMULATING HORMONE: 0.92 MIU/L (ref 0.47–4.68)
TOTAL PROTEIN: 5.2 G/DL (ref 6.1–8.1)
TRIGLYCERIDES: 156 MG/DL (ref 0–149)
UR ASCORBIC ACID: NEGATIVE MG/DL
UR BACTERIA: (no result) /HPF
UR BILIRUBIN (DIP): NEGATIVE MG/DL
UR BLOOD (DIP): (no result) MG/DL
UR CLARITY: CLEAR
UR COLOR: YELLOW
UR GLUCOSE (DIP): NEGATIVE MG/DL
UR KETONES (DIP): NEGATIVE MG/DL
UR LEUKOCYTE ESTERASE (DIP): (no result) LEU/UL
UR NITRITE (DIP): NEGATIVE MG/DL
UR PH (DIP): 6 (ref 5–9)
UR RBC: 4 /HPF (ref 0–5)
UR SPECIFIC GRAVITY (DIP): 1 (ref 1–1.03)
UR SQUAMOUS EPITHELIAL CELL: (no result) /HPF
UR TOTAL PROTEIN (DIP): NEGATIVE MG/DL
UR UROBILINOGEN (DIP): (no result) MG/DL
UR WBC: 11 /HPF (ref 0–5)
WHITE BLOOD COUNT: 7.2 10^3/UL (ref 4.8–10.8)

## 2018-12-25 RX ADMIN — ONDANSETRON PRN MG: 4 TABLET, FILM COATED ORAL at 11:00

## 2018-12-25 RX ADMIN — PIPERACILLIN SODIUM AND TAZOBACTAM SODIUM 1 MLS/HR: 3; .375 INJECTION, POWDER, LYOPHILIZED, FOR SOLUTION INTRAVENOUS at 17:45

## 2018-12-25 RX ADMIN — HYDROCODONE BITARTRATE AND ACETAMINOPHEN PRN TAB: 5; 325 TABLET ORAL at 04:38

## 2018-12-25 RX ADMIN — HYDROMORPHONE HYDROCHLORIDE PRN MG: 1 INJECTION, SOLUTION INTRAMUSCULAR; INTRAVENOUS; SUBCUTANEOUS at 01:57

## 2018-12-25 RX ADMIN — PIPERACILLIN SODIUM AND TAZOBACTAM SODIUM SCH MLS/HR: 3; .375 INJECTION, POWDER, LYOPHILIZED, FOR SOLUTION INTRAVENOUS at 23:49

## 2018-12-25 RX ADMIN — ONDANSETRON HYDROCHLORIDE 1 MG: 4 TABLET, FILM COATED ORAL at 17:44

## 2018-12-25 RX ADMIN — ONDANSETRON PRN MG: 4 TABLET, FILM COATED ORAL at 17:44

## 2018-12-25 RX ADMIN — POTASSIUM CHLORIDE 1 MEQ: 1500 TABLET, EXTENDED RELEASE ORAL at 09:11

## 2018-12-25 RX ADMIN — THIAMINE HYDROCHLORIDE 1 MLS/HR: 100 INJECTION, SOLUTION INTRAMUSCULAR; INTRAVENOUS at 04:38

## 2018-12-25 RX ADMIN — PIPERACILLIN SODIUM AND TAZOBACTAM SODIUM 1 MLS/HR: 3; .375 INJECTION, POWDER, LYOPHILIZED, FOR SOLUTION INTRAVENOUS at 23:49

## 2018-12-25 RX ADMIN — HYDROMORPHONE HYDROCHLORIDE 1 MG: 1 INJECTION, SOLUTION INTRAMUSCULAR; INTRAVENOUS; SUBCUTANEOUS at 01:57

## 2018-12-25 RX ADMIN — KETOROLAC TROMETHAMINE PRN MG: 30 INJECTION, SOLUTION INTRAMUSCULAR at 23:52

## 2018-12-25 RX ADMIN — PIPERACILLIN SODIUM AND TAZOBACTAM SODIUM 1 MLS/HR: 3; .375 INJECTION, POWDER, LYOPHILIZED, FOR SOLUTION INTRAVENOUS at 11:00

## 2018-12-25 RX ADMIN — HYDROCODONE BITARTRATE AND ACETAMINOPHEN 1 TAB: 5; 325 TABLET ORAL at 04:38

## 2018-12-25 RX ADMIN — PIPERACILLIN SODIUM AND TAZOBACTAM SODIUM SCH MLS/HR: 3; .375 INJECTION, POWDER, LYOPHILIZED, FOR SOLUTION INTRAVENOUS at 03:59

## 2018-12-25 RX ADMIN — KETOROLAC TROMETHAMINE 1 MG: 30 INJECTION, SOLUTION INTRAMUSCULAR at 10:56

## 2018-12-25 RX ADMIN — PIPERACILLIN SODIUM AND TAZOBACTAM SODIUM SCH MLS/HR: 3; .375 INJECTION, POWDER, LYOPHILIZED, FOR SOLUTION INTRAVENOUS at 11:00

## 2018-12-25 RX ADMIN — PIPERACILLIN SODIUM AND TAZOBACTAM SODIUM 1 MLS/HR: 3; .375 INJECTION, POWDER, LYOPHILIZED, FOR SOLUTION INTRAVENOUS at 03:59

## 2018-12-25 RX ADMIN — FOLIC ACID SCH MLS/HR: 5 INJECTION, SOLUTION INTRAMUSCULAR; INTRAVENOUS; SUBCUTANEOUS at 04:38

## 2018-12-25 RX ADMIN — ONDANSETRON HYDROCHLORIDE 1 MG: 4 TABLET, FILM COATED ORAL at 11:00

## 2018-12-25 RX ADMIN — KETOROLAC TROMETHAMINE 1 MG: 30 INJECTION, SOLUTION INTRAMUSCULAR at 17:44

## 2018-12-25 RX ADMIN — KETOROLAC TROMETHAMINE PRN MG: 30 INJECTION, SOLUTION INTRAMUSCULAR at 17:44

## 2018-12-25 RX ADMIN — ACETAMINOPHEN 1 MG: 325 TABLET, FILM COATED ORAL at 09:11

## 2018-12-25 RX ADMIN — PIPERACILLIN SODIUM AND TAZOBACTAM SODIUM SCH MLS/HR: 3; .375 INJECTION, POWDER, LYOPHILIZED, FOR SOLUTION INTRAVENOUS at 17:45

## 2018-12-25 RX ADMIN — KETOROLAC TROMETHAMINE 1 MG: 30 INJECTION, SOLUTION INTRAMUSCULAR at 23:52

## 2018-12-25 RX ADMIN — KETOROLAC TROMETHAMINE PRN MG: 30 INJECTION, SOLUTION INTRAMUSCULAR at 10:56

## 2018-12-25 NOTE — NUR
DR Dueñas in the unit, informed of pt's complaint of occasional SOB during exertion for a 
month. Dr Dueñas placed in order for XCR and CT of abdomen.

## 2018-12-25 NOTE — NUR
RN NOTES



patient is alert and oriented X4, verbally responsive and able to make needs known. 
complains of pain and nausea, medicated as ordered and effective. all due medicine given and 
all needs attended to. on schedule for HIDA scan tomorrow, dr. Feldman made aware. patient 
needs to be NPO after midnight as per nuclear medicine dept. will endorse night nurse. call 
light placed within reach and fall precautions observed well. dr. dreyer wants to know, if 
any plan for surgery or any surgery consult plan. dr. feldman made aware and as per MD no 
surgery plan for now. will continue to monitor.

## 2018-12-25 NOTE — PN
Date/Time of Note


Date/Time of Note


DATE: 12/25/18 


TIME: 09:01





Assessment/Plan


VTE Prophylaxis


SCD applied (from Nsg):  Yes


Pharmacological prophylaxis:  LMWH





Lines/Catheters


IV Catheter Type (from Nrsg):  Peripheral IV


Urinary Cath still in place:  No





Assessment/Plan


Assessment/Plan


1. Acute headache


- most likely secondary to dehydration/ poor PO intake


- Denies any visual changes


- Will treat and if continues to worsen, will check CT head to rule out 


pathological cause for pain





2.  Right flank pain


- ? pyelonephritis given denies any urinary difficulty 


- Will check UA and urine cultures


- IV antibiotics on board and will continue on fluids





3.  ?Acute cholecystitis


- US and CT scan results noted.  HIDA scan ordered and pending


- Will continue on IV antibiotics and if no improvement and not tolerating PO 


intake, will consult surgery for evaluation.  No stones appreciated on imaging 


studies


- LFT within normal limits





4.  Sepsis secondary to Bacteremia


- unknown source at this time, pyelo vs acute sidney


- Will repeat blood cultures


- initial cx growing gram neg


- ID consulted for antibiotic management





5.  Hypokalemia


- replaced





6.  Disposition


- HIDA scan pending.  Monitor for improvement and continue current treatment


Result Diagram:  


12/25/18 0528                                                                   


            12/25/18 0528





Results 24hrs





Laboratory Tests


      Test
                                12/24/18
18:17  12/25/18
05:28


      White Blood Count                             9.3            7.2  #


      Red Blood Count                             3.59  L         3.14  L


      Hemoglobin                                  10.8  L          9.4  L


      Hematocrit                                  32.7  L         28.9  L


      Mean Corpuscular Volume                      91.1            92.0


      Mean Corpuscular Hemoglobin                  30.1            29.9


      Mean Corpuscular Hemoglobin
Concent         33.0  
         32.5  



      Red Cell Distribution Width                  12.8            13.1


      Platelet Count                               131  L          117  L


      Mean Platelet Volume                          9.9           10.6  H


      Immature Granulocytes %                     0.400           0.400


      Neutrophils %                               85.2  H         80.1  H


      Lymphocytes %                               10.3  L         12.5  L


      Monocytes %                                   3.7             6.5


      Eosinophils %                                 0.3             0.4


      Basophils %                                   0.1             0.1


      Nucleated Red Blood Cells %                   0.0             0.0


      Immature Granulocytes #                    0.040  H         0.030


      Neutrophils #                                7.9  H           5.8


      Lymphocytes #                                 1.0             0.9


      Monocytes #                                   0.3             0.5


      Eosinophils #                                 0.0             0.0


      Basophils #                                   0.0             0.0


      Nucleated Red Blood Cells #                   0.0             0.0


      Sodium Level                                  141             138


      Potassium Level                               3.5            3.4  L


      Chloride Level                                104             100


      Carbon Dioxide Level                           28              27


      Anion Gap                                       9              11


      Blood Urea Nitrogen                            6  L            5  L


      Creatinine                                   0.63            0.65


      Est Glomerular Filtrat Rate
mL/min   > 60  
         > 60  



      Glucose Level                                 147             116


      Calcium Level                                 8.5            7.9  L


      Total Bilirubin                               0.7             0.4


      Direct Bilirubin                             0.00            0.00


      Indirect Bilirubin                            0.7             0.4


      Aspartate Amino Transf
(AST/SGOT)             30  
           18  



      Alanine Aminotransferase
(ALT/SGPT)           29  
           28  



      Alkaline Phosphatase                           80              61


      Total Protein                                 6.9           5.2  #L


      Albumin                                       3.5            2.8  L


      Globulin                                    3.40  H          2.40


      Albumin/Globulin Ratio                       1.02            1.16


      Lipase                                        10  L


      Hemoglobin A1c                                                5.7


      Magnesium Level                                               2.0


      Gamma Glutamyl Transpeptidase                                 51  H


      Triglycerides Level                                          156  H


      Cholesterol Level                                             134


      LDL Cholesterol, Calculated                                    75


      HDL Cholesterol                                               28  L


      Cholesterol/HDL Ratio                                         4.7


      Thyroid Stimulating Hormone
(TSH)    
                     0.921  



      Free Thyroxine Index                                         2.28


      Thyroxine (T4)                                                6.4


      Triiodothyronine (T3) Uptake                                 35.6








Subjective


24 Hr Interval Summary


Free Text/Dictation


Patient admits to headache with associated nausea and diminished appetite.  She 


also admits to abdominal bloating when she eats but denies any diarrhea or 


vomiting with food intake.





Exam/Review of Systems


Vital Signs


Vitals





Vital Signs


  Date      Temp   Pulse  Resp  B/P (MAP)   Pulse Ox  O2         O2 Flow    FiO2


Time                                                  Delivery   Rate


  12/25/18  100.3     93    18      104/65        97


     07:53                            (78)


  12/24/18                                            Room Air


     20:25








Intake and Output





12/24/18 12/24/18 12/25/18





1515:00


23:00


07:00





IntakeIntake Total


700 ml


1770 ml





BalanceBalance


700 ml


1770 ml














Exam


General: distress secondary to headache


HEENT: Atraumatic, normocephalic. The pupils are equal, round and reactive. 


Extraocular motor are intact


Neck: Supple


Chest: Nontender


Lungs: Clear to auscultation bilaterally no crackles rales or wheezing


Heart: Normal S1-S2, Regular rhythm and rate. No murmurs


Abdomen: soft , right-sided flank tender to palpation, mild RUQ pain to 


palpation, nondistended , bowel sounds are present. No guarding no rebound 


tenderness ,


Extremities: Normal to inspection, no edema no cyanosis





Medications


Medications





Current Medications


Sodium Chloride 1,000 ml @  100 mls/hr Q10H IV  Last administered on 12/25/18at 


04:38; Admin Dose 100 MLS/HR;  Start 12/24/18 at 19:39;  Stop 12/25/18 at 15:38


IV Flush (NS 3 ml) 3 ml PER PROTOCOL IV ;  Start 12/24/18 at 20:00


Ondansetron HCl (Zofran Tab) 4 mg Q6H  PRN PO NAUSEA AND/OR VOMITING Last 


administered on 12/24/18at 20:57; Admin Dose 4 MG;  Start 12/24/18 at 20:00


Acetaminophen (Tylenol Tab) 650 mg Q6H  PRN PO PAIN LEVEL 1-3 OR FEVER;  Start 


12/24/18 at 20:00


Acetaminophen/ Hydrocodone Bitart (Norco (5/325)) 1 tab Q6H  PRN PO MODERATE 


PAIN LEVEL 4-6 Last administered on 12/25/18at 04:38; Admin Dose 1 TAB;  Start 


12/24/18 at 20:00


Docusate Sodium (Colace) 100 mg Q12H  PRN PO CONSTIPATION;  Start 12/24/18 at 


20:00


Bisacodyl (Dulcolax) 5 mg DAILY  PRN PO CONSTIPATION;  Start 12/24/18 at 20:00


Hydromorphone HCl (Dilaudid) 0.5 mg Q4H  PRN IV SEVERE PAIN LEVEL 7-10 Last 


administered on 12/25/18at 01:57; Admin Dose 0.5 MG;  Start 12/25/18 at 00:30


Influenza Virus Vaccine Quadrival (Fluzone) 0.5 ml ONCE ONCE IM* ;  Start 


12/26/18 at 10:00;  Stop 12/26/18 at 10:01


Piperacillin Sod/ Tazobactam Sod 100 ml @  200 mls/hr Q6 IVPB  Last administered


on 12/25/18at 03:59; Admin Dose 200 MLS/HR;  Start 12/25/18 at 03:30











MITCHELL KRAUSE MD                 Dec 25, 2018 09:01

## 2018-12-25 NOTE — NUR
Results for CT abdomen and CXR is out. verified with Dr Dueñas if he is aware of the 
results. DR Dueñas confirmed and placed in new order for US stat and zosyn. 

-------------------------------------------------------------------------------

Addendum: 12/25/18 at 0414 by CARTER NIETO RN

-------------------------------------------------------------------------------

ultrasound done. pt updated with the new orders as needed and answered questions. will 
continue to monitor.

## 2018-12-25 NOTE — NUR
patient afebrile with VSS. medicated with norco for pain with relief. needs attended to and 
anticipated with fall precautions continued. will continue to monitor and will endorse to 
next shift.

## 2018-12-26 VITALS — DIASTOLIC BLOOD PRESSURE: 74 MMHG | RESPIRATION RATE: 16 BRPM | HEART RATE: 84 BPM | SYSTOLIC BLOOD PRESSURE: 119 MMHG

## 2018-12-26 VITALS — RESPIRATION RATE: 16 BRPM | DIASTOLIC BLOOD PRESSURE: 76 MMHG | SYSTOLIC BLOOD PRESSURE: 123 MMHG | HEART RATE: 76 BPM

## 2018-12-26 VITALS — SYSTOLIC BLOOD PRESSURE: 150 MMHG | DIASTOLIC BLOOD PRESSURE: 85 MMHG | HEART RATE: 78 BPM | RESPIRATION RATE: 18 BRPM

## 2018-12-26 VITALS — DIASTOLIC BLOOD PRESSURE: 60 MMHG | HEART RATE: 76 BPM | SYSTOLIC BLOOD PRESSURE: 102 MMHG | RESPIRATION RATE: 18 BRPM

## 2018-12-26 LAB
ADD MAN DIFF?: NO
ALBUMIN: 3.1 G/DL (ref 3.3–4.9)
ANION GAP: 8 (ref 5–13)
BASOPHIL #: 0 10^3/UL (ref 0–0.1)
BASOPHILS %: 0.2 % (ref 0–2)
BLOOD UREA NITROGEN: 8 MG/DL (ref 7–20)
CALCIUM: 8.3 MG/DL (ref 8.4–10.2)
CARBON DIOXIDE: 29 MMOL/L (ref 21–31)
CHLORIDE: 105 MMOL/L (ref 97–110)
CREATININE: 0.65 MG/DL (ref 0.44–1)
EOSINOPHILS #: 0.1 10^3/UL (ref 0–0.5)
EOSINOPHILS %: 1.3 % (ref 0–7)
GLUCOSE: 106 MG/DL (ref 70–220)
HEMATOCRIT: 28.7 % (ref 37–47)
HEMOGLOBIN: 9.3 G/DL (ref 12–16)
IMMATURE GRANS #M: 0.02 10^3/UL (ref 0–0.03)
IMMATURE GRANS % (M): 0.4 % (ref 0–0.43)
LYMPHOCYTES #: 1.1 10^3/UL (ref 0.8–2.9)
LYMPHOCYTES %: 22.7 % (ref 15–51)
MAGNESIUM: 2.4 MG/DL (ref 1.7–2.5)
MEAN CORPUSCULAR HEMOGLOBIN: 29.9 PG (ref 29–33)
MEAN CORPUSCULAR HGB CONC: 32.4 G/DL (ref 32–37)
MEAN CORPUSCULAR VOLUME: 92.3 FL (ref 82–101)
MEAN PLATELET VOLUME: 10.7 FL (ref 7.4–10.4)
MONOCYTE #: 0.3 10^3/UL (ref 0.3–0.9)
MONOCYTES %: 7.3 % (ref 0–11)
NEUTROPHIL #: 3.2 10^3/UL (ref 1.6–7.5)
NEUTROPHILS %: 68.1 % (ref 39–77)
NUCLEATED RED BLOOD CELLS #: 0 10^3/UL (ref 0–0)
NUCLEATED RED BLOOD CELLS%: 0 /100WBC (ref 0–0)
PHOSPHORUS: 3 MG/DL (ref 2.5–4.9)
PLATELET COUNT: 130 10^3/UL (ref 140–415)
POTASSIUM: 4.1 MMOL/L (ref 3.5–5.1)
RED BLOOD COUNT: 3.11 10^6/UL (ref 4.2–5.4)
RED CELL DISTRIBUTION WIDTH: 13.2 % (ref 11.5–14.5)
SODIUM: 142 MMOL/L (ref 135–144)
WHITE BLOOD COUNT: 4.6 10^3/UL (ref 4.8–10.8)

## 2018-12-26 RX ADMIN — ONDANSETRON PRN MG: 4 TABLET, FILM COATED ORAL at 20:30

## 2018-12-26 RX ADMIN — FOLIC ACID SCH MLS/HR: 5 INJECTION, SOLUTION INTRAMUSCULAR; INTRAVENOUS; SUBCUTANEOUS at 11:38

## 2018-12-26 RX ADMIN — PIPERACILLIN SODIUM AND TAZOBACTAM SODIUM SCH MLS/HR: 3; .375 INJECTION, POWDER, LYOPHILIZED, FOR SOLUTION INTRAVENOUS at 17:05

## 2018-12-26 RX ADMIN — ONDANSETRON HYDROCHLORIDE 1 MG: 4 TABLET, FILM COATED ORAL at 08:40

## 2018-12-26 RX ADMIN — ACETAMINOPHEN 1 MG: 325 TABLET, FILM COATED ORAL at 12:03

## 2018-12-26 RX ADMIN — PIPERACILLIN SODIUM AND TAZOBACTAM SODIUM SCH MLS/HR: 3; .375 INJECTION, POWDER, LYOPHILIZED, FOR SOLUTION INTRAVENOUS at 11:28

## 2018-12-26 RX ADMIN — PIPERACILLIN SODIUM AND TAZOBACTAM SODIUM 1 MLS/HR: 3; .375 INJECTION, POWDER, LYOPHILIZED, FOR SOLUTION INTRAVENOUS at 06:07

## 2018-12-26 RX ADMIN — ONDANSETRON PRN MG: 4 TABLET, FILM COATED ORAL at 00:32

## 2018-12-26 RX ADMIN — PIPERACILLIN SODIUM AND TAZOBACTAM SODIUM SCH MLS/HR: 3; .375 INJECTION, POWDER, LYOPHILIZED, FOR SOLUTION INTRAVENOUS at 06:07

## 2018-12-26 RX ADMIN — KETOROLAC TROMETHAMINE 1 MG: 30 INJECTION, SOLUTION INTRAMUSCULAR at 20:32

## 2018-12-26 RX ADMIN — HYDROMORPHONE HYDROCHLORIDE 1 MG: 1 INJECTION, SOLUTION INTRAMUSCULAR; INTRAVENOUS; SUBCUTANEOUS at 11:28

## 2018-12-26 RX ADMIN — ONDANSETRON HYDROCHLORIDE 1 MG: 4 TABLET, FILM COATED ORAL at 00:32

## 2018-12-26 RX ADMIN — FOLIC ACID SCH MLS/HR: 5 INJECTION, SOLUTION INTRAMUSCULAR; INTRAVENOUS; SUBCUTANEOUS at 00:33

## 2018-12-26 RX ADMIN — THIAMINE HYDROCHLORIDE 1 MLS/HR: 100 INJECTION, SOLUTION INTRAMUSCULAR; INTRAVENOUS at 00:33

## 2018-12-26 RX ADMIN — THIAMINE HYDROCHLORIDE 1 MLS/HR: 100 INJECTION, SOLUTION INTRAMUSCULAR; INTRAVENOUS at 11:38

## 2018-12-26 RX ADMIN — PANTOPRAZOLE SODIUM 1 MG: 40 TABLET, DELAYED RELEASE ORAL at 06:11

## 2018-12-26 RX ADMIN — PANTOPRAZOLE SODIUM SCH MG: 40 TABLET, DELAYED RELEASE ORAL at 06:11

## 2018-12-26 RX ADMIN — HYDROMORPHONE HYDROCHLORIDE PRN MG: 1 INJECTION, SOLUTION INTRAMUSCULAR; INTRAVENOUS; SUBCUTANEOUS at 11:28

## 2018-12-26 RX ADMIN — PIPERACILLIN SODIUM AND TAZOBACTAM SODIUM 1 MLS/HR: 3; .375 INJECTION, POWDER, LYOPHILIZED, FOR SOLUTION INTRAVENOUS at 11:28

## 2018-12-26 RX ADMIN — PIPERACILLIN SODIUM AND TAZOBACTAM SODIUM 1 MLS/HR: 3; .375 INJECTION, POWDER, LYOPHILIZED, FOR SOLUTION INTRAVENOUS at 17:05

## 2018-12-26 RX ADMIN — ONDANSETRON PRN MG: 4 TABLET, FILM COATED ORAL at 08:40

## 2018-12-26 RX ADMIN — KETOROLAC TROMETHAMINE PRN MG: 30 INJECTION, SOLUTION INTRAMUSCULAR at 20:32

## 2018-12-26 RX ADMIN — THIAMINE HYDROCHLORIDE 1 MLS/HR: 100 INJECTION, SOLUTION INTRAMUSCULAR; INTRAVENOUS at 17:05

## 2018-12-26 RX ADMIN — KETOROLAC TROMETHAMINE PRN MG: 30 INJECTION, SOLUTION INTRAMUSCULAR at 06:07

## 2018-12-26 RX ADMIN — FOLIC ACID SCH MLS/HR: 5 INJECTION, SOLUTION INTRAMUSCULAR; INTRAVENOUS; SUBCUTANEOUS at 17:05

## 2018-12-26 RX ADMIN — KETOROLAC TROMETHAMINE 1 MG: 30 INJECTION, SOLUTION INTRAMUSCULAR at 06:07

## 2018-12-26 RX ADMIN — ONDANSETRON HYDROCHLORIDE 1 MG: 4 TABLET, FILM COATED ORAL at 20:30

## 2018-12-26 NOTE — NUR
patient came back from, H[facility] Scan no SOB or distress. Spoke to Stevie from [facility], Per 
stevie, patient will be going down again for the Hida scan at 1400 and to keep patient on NPO 
and okay for the patient to receive pain medication.

## 2018-12-26 NOTE — PN
Date/Time of Note


Date/Time of Note


DATE: 12/26/18 


TIME: 08:36





Assessment/Plan


VTE Prophylaxis


Risk score (from Ns)>0 risk:  2


SCD applied (from Ns):  Yes


Pharmacological prophylaxis:  LMWH





Lines/Catheters


IV Catheter Type (from Nrs):  Peripheral IV


Urinary Cath still in place:  No





Assessment/Plan


Assessment/Plan


1. Acute headache- stable


- improves after given pain control 


- most likely secondary to dehydration/ poor PO intake


- Denies any visual changes





2.  Right flank pain


- UA shows leuk esterase.  Urine cx negative but expected since given 


antibiotics prior to cx being obtained


- continue monitoring for improvement


- IV antibiotics on board and will continue on fluids





3.  ?Acute cholecystitis


- US and CT scan results noted.  HIDA scan ordered and pending


- Will continue on IV antibiotics and if no improvement and not tolerating PO 


intake, will consult surgery for evaluation.  No stones appreciated on imaging 


studies


- LFT within normal limits





4.  Sepsis secondary to Bacteremia


- improving


- repeat blood cultures negative to date


- initial blood cx growing gram neg


- ID consultation appreciated





5.  Disposition


- HIDA scan pending and will proceed based on results


Result Diagram:  


12/26/18 0528                                                                   


            12/26/18 0528





Results 24hrs





Laboratory Tests


      Test
                                12/25/18
09:10  12/26/18
05:28


      Urine Color                          YELLOW


      Urine Clarity                        CLEAR


      Urine pH                                      6.0


      Urine Specific Gravity                      1.005


      Urine Ketones                        NEGATIVE


      Urine Nitrite                        NEGATIVE


      Urine Bilirubin                      NEGATIVE


      Urine Urobilinogen                            1+  H


      Urine Leukocyte Esterase                      1+  H


      Urine Microscopic RBC                           4


      Urine Microscopic WBC                         11  H


      Urine Squamous Epithelial
Cells      FEW  
          



      Urine Bacteria                       FEW  A


      Urine Hemoglobin                              2+  H


      Urine Glucose                        NEGATIVE


      Urine Total Protein                  NEGATIVE


      White Blood Count                                           4.6  #L


      Red Blood Count                                             3.11  L


      Hemoglobin                                                   9.3  L


      Hematocrit                                                  28.7  L


      Mean Corpuscular Volume                                      92.3


      Mean Corpuscular Hemoglobin                                  29.9


      Mean Corpuscular Hemoglobin
Concent  
                      32.4  



      Red Cell Distribution Width                                  13.2


      Platelet Count                                               130  L


      Mean Platelet Volume                                        10.7  H


      Immature Granulocytes %                                     0.400


      Neutrophils %                                                68.1


      Lymphocytes %                                                22.7


      Monocytes %                                                   7.3


      Eosinophils %                                                 1.3


      Basophils %                                                   0.2


      Nucleated Red Blood Cells %                                   0.0


      Immature Granulocytes #                                     0.020


      Neutrophils #                                                 3.2


      Lymphocytes #                                                 1.1


      Monocytes #                                                   0.3


      Eosinophils #                                                 0.1


      Basophils #                                                   0.0


      Nucleated Red Blood Cells #                                   0.0


      Sodium Level                                                  142


      Potassium Level                                               4.1


      Chloride Level                                                105


      Carbon Dioxide Level                                           29


      Anion Gap                                                       8


      Blood Urea Nitrogen                                             8


      Creatinine                                                   0.65


      Glucose Level                                                 106


      Calcium Level                                                8.3  L


      Phosphorus Level                                              3.0


      Magnesium Level                                               2.4


      Albumin                                                      3.1  L








Subjective


24 Hr Interval Summary


Free Text/Dictation


Patient states shes still with headaches and nausea.  Not tolerating PO diet due


to bloating and abdominal discomfort.  Plans for HIDA scan today.





Exam/Review of Systems


Vital Signs


Vitals





Vital Signs


  Date      Temp  Pulse  Resp  B/P (MAP)   Pulse Ox  O2          O2 Flow    FiO2


Time                                                 Delivery    Rate


  12/26/18  98.5     76    16      123/76        94


     07:53                           (92)


  12/24/18                                           Room Air


     20:25








Intake and Output





12/25/18 12/25/18 12/26/18





1515:00


23:00


07:00





IntakeIntake Total


400 ml


3150 ml


770 ml





BalanceBalance


400 ml


3150 ml


770 ml














Exam


General: mild distress secondary to headache


HEENT: Atraumatic, normocephalic. The pupils are equal, round and reactive. 


Extraocular motor are intact


Chest: Nontender


Lungs: Clear to auscultation bilaterally no crackles rales or wheezing


Heart: Normal S1-S2, Regular rhythm and rate. No murmurs


Abdomen: soft , diffusely tender to palpation all quadrants and right CVA 


tenderness, nondistended , bowel sounds are present. No guarding no rebound 


tenderness ,


Extremities: Normal to inspection, no edema no cyanosis





Medications


Medications





Current Medications


IV Flush (NS 3 ml) 3 ml PER PROTOCOL IV ;  Start 12/24/18 at 20:00


Ondansetron HCl (Zofran Tab) 4 mg Q6H  PRN PO NAUSEA AND/OR VOMITING Last 


administered on 12/26/18at 00:32; Admin Dose 4 MG;  Start 12/24/18 at 20:00


Acetaminophen (Tylenol Tab) 650 mg Q6H  PRN PO PAIN LEVEL 1-3 OR FEVER Last 


administered on 12/25/18at 09:11; Admin Dose 650 MG;  Start 12/24/18 at 20:00


Acetaminophen/ Hydrocodone Bitart (Norco (5/325)) 1 tab Q6H  PRN PO MODERATE 


PAIN LEVEL 4-6 Last administered on 12/25/18at 04:38; Admin Dose 1 TAB;  Start 


12/24/18 at 20:00


Docusate Sodium (Colace) 100 mg Q12H  PRN PO CONSTIPATION;  Start 12/24/18 at 


20:00


Bisacodyl (Dulcolax) 5 mg DAILY  PRN PO CONSTIPATION;  Start 12/24/18 at 20:00


Hydromorphone HCl (Dilaudid) 0.5 mg Q4H  PRN IV SEVERE PAIN LEVEL 7-10 Last 


administered on 12/25/18at 01:57; Admin Dose 0.5 MG;  Start 12/25/18 at 00:30


Influenza Virus Vaccine Quadrival (Fluzone) 0.5 ml ONCE ONCE IM* ;  Start 


12/26/18 at 10:00;  Stop 12/26/18 at 10:01


Piperacillin Sod/ Tazobactam Sod 100 ml @  200 mls/hr Q6 IVPB  Last administered


on 12/26/18at 06:07; Admin Dose 200 MLS/HR;  Start 12/25/18 at 03:30


Ketorolac Tromethamine (Toradol) 30 mg Q6H  PRN IV PAIN LEVEL 1-3 Last 


administered on 12/26/18at 06:07; Admin Dose 30 MG;  Start 12/25/18 at 10:30;  


Stop 12/28/18 at 10:29


Pantoprazole (Protonix Tab) 40 mg DAILY@06 PO  Last administered on 12/26/18at 


06:11; Admin Dose 40 MG;  Start 12/26/18 at 06:00


Sodium Chloride 1,000 ml @  80 mls/hr [id number] IV  Last administered on 


12/26/18at 00:33; Admin Dose 80 MLS/HR;  Start 12/26/18 at 00:00











MITCHELL KRAUSE MD                 Dec 26, 2018 08:36

## 2018-12-26 NOTE — CONS
Date/Time of Note


Date/Time of Note


DATE: 12/26/18 


TIME: 12:53





Assessment/Plan


Assessment/Plan


Hospital Course


Patient is awake complaining of headache and right upper quadrant abdominal 


pain.  She is in no distress.  T-max yesterday 100.32 current 98 WBC 4.6 H&H 9.3


and 28.7 platelets 130 neutrophils 68.1 BUN 8 creatinine 0.65





Microbiology: Blood and urine culture on December 23 grew E. coli, repeat blood 


cultures negative





Antimicrobials: Zosyn





CT of the abdomen and pelvis revealed possible cholecystitis





Physical examination: Well-developed obese middle-aged  woman who is 


awake in no distress.  Head atraumatic normocephalic.  Neck is supple chest rise


symmetrical breath sounds clear.  Heart: S1-S2.  Abdomen soft patient has 


tenderness over the right upper quadrant bowel sounds present extremities 


without cyanosis





Assessment:


1.  E. coli bacteremia secondary to urinary tract infection


2.  Probable acute cholecystitis


3.  Headache





Plan: Patient remains stable, she is on appropriate antibiotics, consider HIDA 


scan and surgical evaluation


Result Diagram:  


12/26/18 0528                                                                   


            12/26/18 0528





Results 24hrs





Laboratory Tests


              Test
                                12/26/18
05:28


              White Blood Count                           4.6  #L


              Red Blood Count                             3.11  L


              Hemoglobin                                   9.3  L


              Hematocrit                                  28.7  L


              Mean Corpuscular Volume                      92.3


              Mean Corpuscular Hemoglobin                  29.9


              Mean Corpuscular Hemoglobin
Concent         32.4  



              Red Cell Distribution Width                  13.2


              Platelet Count                               130  L


              Mean Platelet Volume                        10.7  H


              Immature Granulocytes %                     0.400


              Neutrophils %                                68.1


              Lymphocytes %                                22.7


              Monocytes %                                   7.3


              Eosinophils %                                 1.3


              Basophils %                                   0.2


              Nucleated Red Blood Cells %                   0.0


              Immature Granulocytes #                     0.020


              Neutrophils #                                 3.2


              Lymphocytes #                                 1.1


              Monocytes #                                   0.3


              Eosinophils #                                 0.1


              Basophils #                                   0.0


              Nucleated Red Blood Cells #                   0.0


              Sodium Level                                  142


              Potassium Level                               4.1


              Chloride Level                                105


              Carbon Dioxide Level                           29


              Anion Gap                                       8


              Blood Urea Nitrogen                             8


              Creatinine                                   0.65


              Glucose Level                                 106


              Calcium Level                                8.3  L


              Phosphorus Level                              3.0


              Magnesium Level                               2.4


              Albumin                                      3.1  L








Consultation Date/Type/Reason


Admit Date/Time


Dec 25, 2018 at 13:37


Initial Consult Date





Type of Consult


id





Exam/Review of Systems


Vital Signs


Vitals





Vital Signs


  Date      Temp  Pulse  Resp  B/P (MAP)   Pulse Ox  O2          O2 Flow    FiO2


Time                                                 Delivery    Rate


  12/26/18  98.0


     12:03


  12/26/18           76    16      123/76        94


     07:53                           (92)


  12/24/18                                           Room Air


     20:25








Intake and Output





12/25/18 12/25/18 12/26/18





1515:00


23:00


07:00





IntakeIntake Total


400 ml


3150 ml


770 ml





BalanceBalance


400 ml


3150 ml


770 ml














Medications


Medications





Current Medications


IV Flush (NS 3 ml) 3 ml PER PROTOCOL IV ;  Start 12/24/18 at 20:00


Ondansetron HCl (Zofran Tab) 4 mg Q6H  PRN PO NAUSEA AND/OR VOMITING Last 


administered on 12/26/18at 08:40; Admin Dose 4 MG;  Start 12/24/18 at 20:00


Acetaminophen (Tylenol Tab) 650 mg Q6H  PRN PO PAIN LEVEL 1-3 OR FEVER Last 


administered on 12/26/18at 12:03; Admin Dose 650 MG;  Start 12/24/18 at 20:00


Acetaminophen/ Hydrocodone Bitart (Norco (5/325)) 1 tab Q6H  PRN PO MODERATE 


PAIN LEVEL 4-6 Last administered on 12/25/18at 04:38; Admin Dose 1 TAB;  Start 


12/24/18 at 20:00


Docusate Sodium (Colace) 100 mg Q12H  PRN PO CONSTIPATION;  Start 12/24/18 at 


20:00


Bisacodyl (Dulcolax) 5 mg DAILY  PRN PO CONSTIPATION;  Start 12/24/18 at 20:00


Hydromorphone HCl (Dilaudid) 0.5 mg Q4H  PRN IV SEVERE PAIN LEVEL 7-10 Last 


administered on 12/26/18at 11:28; Admin Dose 0.5 MG;  Start 12/25/18 at 00:30


Piperacillin Sod/ Tazobactam Sod 100 ml @  200 mls/hr Q6 IVPB  Last administered


on 12/26/18at 11:28; Admin Dose 200 MLS/HR;  Start 12/25/18 at 03:30


Ketorolac Tromethamine (Toradol) 30 mg Q6H  PRN IV PAIN LEVEL 1-3 Last 


administered on 12/26/18at 06:07; Admin Dose 30 MG;  Start 12/25/18 at 10:30;  


Stop 12/28/18 at 10:29


Pantoprazole (Protonix Tab) 40 mg DAILY@06 PO  Last administered on 12/26/18at 


06:11; Admin Dose 40 MG;  Start 12/26/18 at 06:00


Sodium Chloride 1,000 ml @  80 mls/hr [id number] IV  Last administered on 


12/26/18at 00:33; Admin Dose 80 MLS/HR;  Start 12/26/18 at 00:00











CASSIE GRAF NP            Dec 26, 2018 12:53

## 2018-12-26 NOTE — NUR
End of shift Report:

Sleeping on bed in comfortable position. No sob. No resp distress. Afebrile. Medicated for 
flank pain  x2 , well-ruiz and effective. Nausea med given as ordered. On NPO after midnight 
for HIDA scan in am.  No acute events  overnight. Needs attended and anticipated. Call light 
within reach. Will continue to monitor pt. Will endorse accordingly.

## 2018-12-26 NOTE — NUR
patient was picked up by transport for the Hida Scan as ordered. Left the unit with no SOB 
or distress, IV patent and intact.

## 2018-12-26 NOTE — NUR
Nurse Notes:

Patient alert and oriented x 4 , able to make needs known remained stable throughout the 
shift with no acute changes noted. no SOB or distress. assessed and reassessed for pain, 
medicated with pain medications as ordered. All due medications were given, tolerated well. 
Iv patent and intact. safety precautions observed, hourly rounding done, bed alarm and bed 
brakes on for safety. Call light and telephone within reach at all times. Encouraged to use 
call light and telephone whenever assistance is needed. Will continue to monitor. Will 
endorse [id number] to next shift fro continuity of care.

## 2018-12-27 VITALS — HEART RATE: 69 BPM | RESPIRATION RATE: 18 BRPM | DIASTOLIC BLOOD PRESSURE: 86 MMHG | SYSTOLIC BLOOD PRESSURE: 135 MMHG

## 2018-12-27 VITALS — RESPIRATION RATE: 18 BRPM | SYSTOLIC BLOOD PRESSURE: 138 MMHG | HEART RATE: 80 BPM | DIASTOLIC BLOOD PRESSURE: 71 MMHG

## 2018-12-27 VITALS — DIASTOLIC BLOOD PRESSURE: 82 MMHG | RESPIRATION RATE: 18 BRPM | SYSTOLIC BLOOD PRESSURE: 135 MMHG | HEART RATE: 73 BPM

## 2018-12-27 VITALS — HEART RATE: 78 BPM | DIASTOLIC BLOOD PRESSURE: 87 MMHG | SYSTOLIC BLOOD PRESSURE: 143 MMHG | RESPIRATION RATE: 18 BRPM

## 2018-12-27 LAB
ADD MAN DIFF?: NO
ALANINE AMINOTRANSFERASE: 34 IU/L (ref 13–69)
ALBUMIN/GLOBULIN RATIO: 1
ALBUMIN: 3 G/DL (ref 3.3–4.9)
ALKALINE PHOSPHATASE: 141 IU/L (ref 42–121)
ANION GAP: 11 (ref 5–13)
ASPARTATE AMINO TRANSFERASE: 28 IU/L (ref 15–46)
BASOPHIL #: 0 10^3/UL (ref 0–0.1)
BASOPHILS %: 0.2 % (ref 0–2)
BILIRUBIN,DIRECT: 0 MG/DL (ref 0–0.2)
BILIRUBIN,TOTAL: 0.3 MG/DL (ref 0.2–1.3)
BLOOD UREA NITROGEN: 8 MG/DL (ref 7–20)
CALCIUM: 7.8 MG/DL (ref 8.4–10.2)
CARBON DIOXIDE: 24 MMOL/L (ref 21–31)
CHLORIDE: 106 MMOL/L (ref 97–110)
CREATININE: 0.6 MG/DL (ref 0.44–1)
EOSINOPHILS #: 0.1 10^3/UL (ref 0–0.5)
EOSINOPHILS %: 2 % (ref 0–7)
GLOBULIN: 3 G/DL (ref 1.3–3.2)
GLUCOSE: 89 MG/DL (ref 70–220)
HEMATOCRIT: 27.2 % (ref 37–47)
HEMOGLOBIN: 8.9 G/DL (ref 12–16)
IMMATURE GRANS #M: 0.05 10^3/UL (ref 0–0.03)
IMMATURE GRANS % (M): 0.9 % (ref 0–0.43)
LYMPHOCYTES #: 1.3 10^3/UL (ref 0.8–2.9)
LYMPHOCYTES %: 22.2 % (ref 15–51)
MAGNESIUM: 2.1 MG/DL (ref 1.7–2.5)
MEAN CORPUSCULAR HEMOGLOBIN: 30.1 PG (ref 29–33)
MEAN CORPUSCULAR HGB CONC: 32.7 G/DL (ref 32–37)
MEAN CORPUSCULAR VOLUME: 91.9 FL (ref 82–101)
MEAN PLATELET VOLUME: 11.1 FL (ref 7.4–10.4)
MONOCYTE #: 0.5 10^3/UL (ref 0.3–0.9)
MONOCYTES %: 9.2 % (ref 0–11)
NEUTROPHIL #: 3.7 10^3/UL (ref 1.6–7.5)
NEUTROPHILS %: 65.5 % (ref 39–77)
NUCLEATED RED BLOOD CELLS #: 0 10^3/UL (ref 0–0)
NUCLEATED RED BLOOD CELLS%: 0 /100WBC (ref 0–0)
PLATELET COUNT: 156 10^3/UL (ref 140–415)
POTASSIUM: 3.9 MMOL/L (ref 3.5–5.1)
RED BLOOD COUNT: 2.96 10^6/UL (ref 4.2–5.4)
RED CELL DISTRIBUTION WIDTH: 12.9 % (ref 11.5–14.5)
SODIUM: 141 MMOL/L (ref 135–144)
TOTAL PROTEIN: 6 G/DL (ref 6.1–8.1)
WHITE BLOOD COUNT: 5.6 10^3/UL (ref 4.8–10.8)

## 2018-12-27 RX ADMIN — THIAMINE HYDROCHLORIDE 1 MLS/HR: 100 INJECTION, SOLUTION INTRAMUSCULAR; INTRAVENOUS at 10:17

## 2018-12-27 RX ADMIN — IOHEXOL 1 ML: 300 INJECTION, SOLUTION INTRAVENOUS at 13:45

## 2018-12-27 RX ADMIN — PIPERACILLIN SODIUM AND TAZOBACTAM SODIUM 1 MLS/HR: 3; .375 INJECTION, POWDER, LYOPHILIZED, FOR SOLUTION INTRAVENOUS at 00:06

## 2018-12-27 RX ADMIN — PIPERACILLIN SODIUM AND TAZOBACTAM SODIUM SCH MLS/HR: 3; .375 INJECTION, POWDER, LYOPHILIZED, FOR SOLUTION INTRAVENOUS at 11:47

## 2018-12-27 RX ADMIN — ONDANSETRON HYDROCHLORIDE 1 MG: 4 TABLET, FILM COATED ORAL at 14:02

## 2018-12-27 RX ADMIN — KETOROLAC TROMETHAMINE PRN MG: 30 INJECTION, SOLUTION INTRAMUSCULAR at 05:41

## 2018-12-27 RX ADMIN — KETOROLAC TROMETHAMINE 1 MG: 30 INJECTION, SOLUTION INTRAMUSCULAR at 05:41

## 2018-12-27 RX ADMIN — PIPERACILLIN SODIUM AND TAZOBACTAM SODIUM SCH MLS/HR: 3; .375 INJECTION, POWDER, LYOPHILIZED, FOR SOLUTION INTRAVENOUS at 05:37

## 2018-12-27 RX ADMIN — ONDANSETRON PRN MG: 4 TABLET, FILM COATED ORAL at 05:40

## 2018-12-27 RX ADMIN — PIPERACILLIN SODIUM AND TAZOBACTAM SODIUM 1 MLS/HR: 3; .375 INJECTION, POWDER, LYOPHILIZED, FOR SOLUTION INTRAVENOUS at 05:37

## 2018-12-27 RX ADMIN — PANTOPRAZOLE SODIUM SCH MG: 40 TABLET, DELAYED RELEASE ORAL at 05:37

## 2018-12-27 RX ADMIN — ONDANSETRON PRN MG: 4 TABLET, FILM COATED ORAL at 14:02

## 2018-12-27 RX ADMIN — PIPERACILLIN SODIUM AND TAZOBACTAM SODIUM 1 MLS/HR: 3; .375 INJECTION, POWDER, LYOPHILIZED, FOR SOLUTION INTRAVENOUS at 17:38

## 2018-12-27 RX ADMIN — FLUTICASONE PROPIONATE 1 SPRAY: 50 SPRAY, METERED NASAL at 20:18

## 2018-12-27 RX ADMIN — PIPERACILLIN SODIUM AND TAZOBACTAM SODIUM 1 MLS/HR: 3; .375 INJECTION, POWDER, LYOPHILIZED, FOR SOLUTION INTRAVENOUS at 11:47

## 2018-12-27 RX ADMIN — LORATADINE, PSEUDOEPHEDRINE SULFATE 1 TAB: 5; 120 TABLET, FILM COATED, EXTENDED RELEASE ORAL at 16:01

## 2018-12-27 RX ADMIN — PANTOPRAZOLE SODIUM 1 MG: 40 TABLET, DELAYED RELEASE ORAL at 05:37

## 2018-12-27 RX ADMIN — HYDROMORPHONE HYDROCHLORIDE PRN MG: 1 INJECTION, SOLUTION INTRAMUSCULAR; INTRAVENOUS; SUBCUTANEOUS at 10:14

## 2018-12-27 RX ADMIN — HYDROMORPHONE HYDROCHLORIDE 1 MG: 1 INJECTION, SOLUTION INTRAMUSCULAR; INTRAVENOUS; SUBCUTANEOUS at 10:14

## 2018-12-27 RX ADMIN — PIPERACILLIN SODIUM AND TAZOBACTAM SODIUM SCH MLS/HR: 3; .375 INJECTION, POWDER, LYOPHILIZED, FOR SOLUTION INTRAVENOUS at 00:06

## 2018-12-27 RX ADMIN — FOLIC ACID SCH MLS/HR: 5 INJECTION, SOLUTION INTRAMUSCULAR; INTRAVENOUS; SUBCUTANEOUS at 10:17

## 2018-12-27 RX ADMIN — ONDANSETRON HYDROCHLORIDE 1 MG: 4 TABLET, FILM COATED ORAL at 05:40

## 2018-12-27 RX ADMIN — HYDROMORPHONE HYDROCHLORIDE PRN MG: 1 INJECTION, SOLUTION INTRAMUSCULAR; INTRAVENOUS; SUBCUTANEOUS at 00:27

## 2018-12-27 RX ADMIN — VASOPRESSIN 1 ML/10 SEC: 20 INJECTION, SOLUTION INTRAMUSCULAR; SUBCUTANEOUS at 13:45

## 2018-12-27 RX ADMIN — LORATADINE, PSEUDOEPHEDRINE SULFATE 1 TAB: 5; 120 TABLET, FILM COATED, EXTENDED RELEASE ORAL at 20:19

## 2018-12-27 RX ADMIN — HYDROMORPHONE HYDROCHLORIDE 1 MG: 1 INJECTION, SOLUTION INTRAMUSCULAR; INTRAVENOUS; SUBCUTANEOUS at 00:27

## 2018-12-27 RX ADMIN — SODIUM CHLORIDE 1 ML: 9 INJECTION, SOLUTION INTRAMUSCULAR; INTRAVENOUS; SUBCUTANEOUS at 13:45

## 2018-12-27 RX ADMIN — PIPERACILLIN SODIUM AND TAZOBACTAM SODIUM SCH MLS/HR: 3; .375 INJECTION, POWDER, LYOPHILIZED, FOR SOLUTION INTRAVENOUS at 17:38

## 2018-12-27 RX ADMIN — BUTALBITAL, ACETAMINOPHEN, AND CAFFEINE 1 TAB: 50; 325; 40 TABLET ORAL at 14:02

## 2018-12-27 RX ADMIN — FLUTICASONE PROPIONATE 1 SPRAY: 50 SPRAY, METERED NASAL at 16:01

## 2018-12-27 NOTE — PN
Date/Time of Note


Date/Time of Note


DATE: 12/27/18 


TIME: 08:45





Assessment/Plan


VTE Prophylaxis


Risk score (from Ns)>0 risk:  3


SCD applied (from Ns):  Yes


Pharmacological prophylaxis:  LMWH





Lines/Catheters


IV Catheter Type (from Nrsg):  Peripheral IV


Urinary Cath still in place:  No





Assessment/Plan


Assessment/Plan


1. Acute headache


- Will check CT head to rule out any pathological causes for HA


- Will try Fioricet and transition off IV pain control


- Denies any visual changes





2.  Right flank pain- improving


- UA shows leuk esterase.  Urine cx negative but expected since given 


antibiotics prior to cx being obtained


- continue monitoring for improvement


- IV antibiotics on board and will continue on fluids


- ID on board as well





3.  ?Acute cholecystitis


- US and CT scan results noted


- HIDA reviewed by Surgery and negative.  Will not proceed with surgical interv


ention at this time.  


- Discussed finding with patient and need to continue antibiotics and outpatient


monitoring


- LFT within normal limits





4.  Sepsis secondary to Bacteremia


- improving


- repeat blood cultures negative to date


- initial blood cx growing gram neg


- ID consultation appreciated





5.  Disposition


- Will order CT scan head to assess headache


Result Diagram:  


12/27/18 0616                                                                   


            12/27/18 0616





Results 24hrs





Laboratory Tests


              Test
                                12/27/18
06:16


              White Blood Count                            5.6  #


              Red Blood Count                             2.96  L


              Hemoglobin                                   8.9  L


              Hematocrit                                  27.2  L


              Mean Corpuscular Volume                      91.9


              Mean Corpuscular Hemoglobin                  30.1


              Mean Corpuscular Hemoglobin
Concent         32.7  



              Red Cell Distribution Width                  12.9


              Platelet Count                                156


              Mean Platelet Volume                        11.1  H


              Immature Granulocytes %                    0.900  H


              Neutrophils %                                65.5


              Lymphocytes %                                22.2


              Monocytes %                                   9.2


              Eosinophils %                                 2.0


              Basophils %                                   0.2


              Nucleated Red Blood Cells %                   0.0


              Immature Granulocytes #                    0.050  H


              Neutrophils #                                 3.7


              Lymphocytes #                                 1.3


              Monocytes #                                   0.5


              Eosinophils #                                 0.1


              Basophils #                                   0.0


              Nucleated Red Blood Cells #                   0.0


              Sodium Level                                  141


              Potassium Level                               3.9


              Chloride Level                                106


              Carbon Dioxide Level                           24


              Anion Gap                                      11


              Blood Urea Nitrogen                             8


              Creatinine                                   0.60


              Est Glomerular Filtrat Rate
mL/min   > 60  



              Glucose Level                                  89


              Calcium Level                                7.8  L


              Magnesium Level                               2.1


              Total Bilirubin                               0.3


              Direct Bilirubin                             0.00


              Indirect Bilirubin                            0.3


              Aspartate Amino Transf
(AST/SGOT)             28  



              Alanine Aminotransferase
(ALT/SGPT)           34  



              Alkaline Phosphatase                         141  H


              Total Protein                                6.0  L


              Albumin                                      3.0  L


              Globulin                                     3.00


              Albumin/Globulin Ratio                       1.00








Subjective


24 Hr Interval Summary


Free Text/Dictation


Patient states shes feeling better but still experiencing headache.  Relief with


Dilaudid but still causing nausea.  No acute overnight events.





Exam/Review of Systems


Vital Signs


Vitals





Vital Signs


  Date      Temp  Pulse  Resp  B/P (MAP)   Pulse Ox  O2          O2 Flow    FiO2


Time                                                 Delivery    Rate


  12/27/18  98.5     69    18      135/86        98


     02:00                          (102)


  12/24/18                                           Room Air


     20:25








Intake and Output





12/26/18 12/26/18 12/27/18





1515:00


23:00


07:00





IntakeIntake Total


100 ml


1780 ml


200 ml





BalanceBalance


100 ml


1780 ml


200 ml














Exam


General: no acute distress. answering questions appropriately


HEENT: Atraumatic, normocephalic. The pupils are equal, round and reactive. 


Extraocular motor are intact


Chest: Nontender


Lungs: Clear to auscultation bilaterally no crackles rales or wheezing


Heart: Normal S1-S2, Regular rhythm and rate. No murmurs


Abdomen: soft , mildly tender to palpation all quadrants, nondistended , bowel 


sounds are present. No guarding no rebound tenderness ,


Extremities: Normal to inspection, no edema no cyanosis





Medications


Medications





Current Medications


IV Flush (NS 3 ml) 3 ml PER PROTOCOL IV ;  Start 12/24/18 at 20:00


Ondansetron HCl (Zofran Tab) 4 mg Q6H  PRN PO NAUSEA AND/OR VOMITING Last 


administered on 12/27/18at 05:40; Admin Dose 4 MG;  Start 12/24/18 at 20:00


Acetaminophen (Tylenol Tab) 650 mg Q6H  PRN PO PAIN LEVEL 1-3 OR FEVER Last 


administered on 12/26/18at 12:03; Admin Dose 650 MG;  Start 12/24/18 at 20:00


Acetaminophen/ Hydrocodone Bitart (Norco (5/325)) 1 tab Q6H  PRN PO MODERATE 


PAIN LEVEL 4-6 Last administered on 12/25/18at 04:38; Admin Dose 1 TAB;  Start 


12/24/18 at 20:00


Docusate Sodium (Colace) 100 mg Q12H  PRN PO CONSTIPATION;  Start 12/24/18 at 20


:00


Bisacodyl (Dulcolax) 5 mg DAILY  PRN PO CONSTIPATION;  Start 12/24/18 at 20:00


Hydromorphone HCl (Dilaudid) 0.5 mg Q4H  PRN IV SEVERE PAIN LEVEL 7-10 Last 


administered on 12/27/18at 00:27; Admin Dose 0.5 MG;  Start 12/25/18 at 00:30


Piperacillin Sod/ Tazobactam Sod 100 ml @  200 mls/hr Q6 IVPB  Last administered


on 12/27/18at 05:37; Admin Dose 200 MLS/HR;  Start 12/25/18 at 03:30


Ketorolac Tromethamine (Toradol) 30 mg Q6H  PRN IV PAIN LEVEL 1-3 Last 


administered on 12/27/18at 05:41; Admin Dose 30 MG;  Start 12/25/18 at 10:30;  


Stop 12/28/18 at 10:29


Pantoprazole (Protonix Tab) 40 mg DAILY@06 PO  Last administered on 12/27/18at 


05:37; Admin Dose 40 MG;  Start 12/26/18 at 06:00


Sodium Chloride 1,000 ml @  80 mls/hr [id number] IV  Last administered on 


12/26/18at 17:05; Admin Dose 80 MLS/HR;  Start 12/26/18 at 00:00











MITCHELL KRAUSE MD                 Dec 27, 2018 08:45

## 2018-12-27 NOTE — CONS
Date/Time of Note


Date/Time of Note


DATE: 12/27/18 


TIME: 14:50





Assessment/Plan


Assessment/Plan


Hospital Course


Patient is alert feels the same still with right upper quadrant abdominal 


tenderness, no fevers 





WBC 5.6 no shift no bands BUN 8 creatinine 0.60





Microbiology: Blood and urine culture on December 23 grew E. coli, repeat blood 


cultures negative





Antimicrobials: Zosyn





CT of the abdomen and pelvis revealed possible cholecystitis.   HIDA scan 


negative





Physical examination: Well-developed obese middle-aged  woman who is 


awake in no distress.  Head atraumatic normocephalic.  Neck is supple chest rise


symmetrical breath sounds clear.  Heart: S1-S2.  Abdomen soft patient has 


tenderness over the right upper quadrant bowel sounds present extremities 


without cyanosis





Assessment:


1.  E. coli bacteremia secondary to urinary tract infection


2.  RUQ abdominal pain


3.  Headache





Plan: Clinically unchanged overall stable, repeat blood cultures negative, plan 


to start on PO today, no need for surgical intervention per report, anticipate 


discharge on oral ciprofloxacin to complete 10 more days


Result Diagram:  


12/27/18 0616                                                                   


            12/27/18 0616





Results 24hrs





Laboratory Tests


              Test
                                12/27/18
06:16


              White Blood Count                            5.6  #


              Red Blood Count                             2.96  L


              Hemoglobin                                   8.9  L


              Hematocrit                                  27.2  L


              Mean Corpuscular Volume                      91.9


              Mean Corpuscular Hemoglobin                  30.1


              Mean Corpuscular Hemoglobin
Concent         32.7  



              Red Cell Distribution Width                  12.9


              Platelet Count                                156


              Mean Platelet Volume                        11.1  H


              Immature Granulocytes %                    0.900  H


              Neutrophils %                                65.5


              Lymphocytes %                                22.2


              Monocytes %                                   9.2


              Eosinophils %                                 2.0


              Basophils %                                   0.2


              Nucleated Red Blood Cells %                   0.0


              Immature Granulocytes #                    0.050  H


              Neutrophils #                                 3.7


              Lymphocytes #                                 1.3


              Monocytes #                                   0.5


              Eosinophils #                                 0.1


              Basophils #                                   0.0


              Nucleated Red Blood Cells #                   0.0


              Sodium Level                                  141


              Potassium Level                               3.9


              Chloride Level                                106


              Carbon Dioxide Level                           24


              Anion Gap                                      11


              Blood Urea Nitrogen                             8


              Creatinine                                   0.60


              Est Glomerular Filtrat Rate
mL/min   > 60  



              Glucose Level                                  89


              Calcium Level                                7.8  L


              Magnesium Level                               2.1


              Total Bilirubin                               0.3


              Direct Bilirubin                             0.00


              Indirect Bilirubin                            0.3


              Aspartate Amino Transf
(AST/SGOT)             28  



              Alanine Aminotransferase
(ALT/SGPT)           34  



              Alkaline Phosphatase                         141  H


              Total Protein                                6.0  L


              Albumin                                      3.0  L


              Globulin                                     3.00


              Albumin/Globulin Ratio                       1.00








Consultation Date/Type/Reason


Admit Date/Time


Dec 25, 2018 at 13:37


Initial Consult Date





Type of Consult


id





Exam/Review of Systems


Vital Signs


Vitals





Vital Signs


  Date      Temp  Pulse  Resp  B/P (MAP)   Pulse Ox  O2          O2 Flow    FiO2


Time                                                 Delivery    Rate


  12/27/18  98.4     73    18      135/82        96


     08:48                           (99)


  12/24/18                                           Room Air


     20:25








Intake and Output





12/26/18 12/26/18 12/27/18





1515:00


23:00


07:00





IntakeIntake Total


100 ml


1780 ml


200 ml





BalanceBalance


100 ml


1780 ml


200 ml














Medications


Medications





Current Medications


IV Flush (NS 3 ml) 3 ml PER PROTOCOL IV ;  Start 12/24/18 at 20:00


Ondansetron HCl (Zofran Tab) 4 mg Q6H  PRN PO NAUSEA AND/OR VOMITING Last 


administered on 12/27/18at 14:02; Admin Dose 4 MG;  Start 12/24/18 at 20:00


Acetaminophen (Tylenol Tab) 650 mg Q6H  PRN PO PAIN LEVEL 1-3 OR FEVER Last 


administered on 12/26/18at 12:03; Admin Dose 650 MG;  Start 12/24/18 at 20:00


Acetaminophen/ Hydrocodone Bitart (Norco (5/325)) 1 tab Q6H  PRN PO MODERATE 


PAIN LEVEL 4-6 Last administered on 12/25/18at 04:38; Admin Dose 1 TAB;  Start 


12/24/18 at 20:00


Docusate Sodium (Colace) 100 mg Q12H  PRN PO CONSTIPATION;  Start 12/24/18 at 


20:00


Bisacodyl (Dulcolax) 5 mg DAILY  PRN PO CONSTIPATION;  Start 12/24/18 at 20:00


Hydromorphone HCl (Dilaudid) 0.5 mg Q4H  PRN IV SEVERE PAIN LEVEL 7-10 Last 


administered on 12/27/18at 10:14; Admin Dose 0.5 MG;  Start 12/25/18 at 00:30


Piperacillin Sod/ Tazobactam Sod 100 ml @  200 mls/hr Q6 IVPB  Last administered


on 12/27/18at 11:47; Admin Dose 200 MLS/HR;  Start 12/25/18 at 03:30


Ketorolac Tromethamine (Toradol) 30 mg Q6H  PRN IV PAIN LEVEL 1-3 Last 


administered on 12/27/18at 05:41; Admin Dose 30 MG;  Start 12/25/18 at 10:30;  


Stop 12/28/18 at 10:29


Pantoprazole (Protonix Tab) 40 mg DAILY@06 PO  Last administered on 12/27/18at 


05:37; Admin Dose 40 MG;  Start 12/26/18 at 06:00


Sodium Chloride 1,000 ml @  80 mls/hr [id number] IV  Last administered on 


12/27/18at 10:17; Admin Dose 80 MLS/HR;  Start 12/26/18 at 00:00


Acetaminophen/ Butalbital/ Caffeine (Fioricet) 1 tab Q4H  PRN PO PAIN Last 


administered on 12/27/18at 14:02; Admin Dose 1 TAB;  Start 12/27/18 at 12:00











CASSIE GRAF NP            Dec 27, 2018 14:52

## 2018-12-27 NOTE — NUR
Procedure Ordered:CT BRAI WO/W CONTRAST





Reason for Exam Today:HA





Previous Exams:





Allergies:NKDA





Current Medications Taken:

Glucophage ( )  Metformin ( )





Previous reaction to contrast media:  Yes ( ) No (X )



Pregnant:       Yes ( ) No ( X)             Asthma:           Yes ( ) No ( X)

Diabetes:       Yes ( ) No (X)             Myeloma:          Yes ( ) No (X )

Heart Disease:  Yes ( ) No ( X)             Cardiac Disease:  Yes ( ) No (X )

Kidney Disease: Yes ( ) No ( X)             Vascular Disease: Yes ( ) No (X )

_______________________________________________________________________________

Patient Teaching done:  Yes ( ) No ( )      Used: Yes ( ) No ( )

                                           Name of :                              
                              Language Used:



As part of the test requested by your doctor, contrast media may be injected into your vein 
while the x-rays are being taken. Occasionally, reactions from IV contrast may occur. The 
physician and staff of this hospital are trained to treat these reactions.

_______________________________________________________________________________

Select the type of Contrast that will be given to patient:



Isovue 300 ( )   Isovue 370 ( ) Visipaque ( ) Cystografin ( ) OMNIPAQUE 300 (X)



Gastrographin ( ) Redi-cat ( ) Volumen ( )





Amount of contrast to be given:   80CC               IV (X )  PO ( )





Date given:12/27/18





Lab Values:   BUN:  8          Creatinine:0.60



Reason why contrast cannot be given:





Location of patient pre-procedure:[facility]





Location of patient post procedure:[facility]







PT TOLERATED IV CONTRAST INJECTION WELL

## 2018-12-27 NOTE — NUR
RN Notes:

patient remains alert and oriented, no acute distress noted. afebrile, no sob noted, c/o 
headache, Dr. Moses aware and received ordered. CT Scan of the Head done, result still 
pending medicated with pain medication, c/o feeling nauseous, no vomiting noted, requested 
for Zofran given as ordered. Per Dr. Moses will not proceed with surgical intervention at 
this time per surgeon point of view, started pt on [place] diet. Hourly rounding done, call 
light within reach, bed alarm on. Will continue to monitor until the end of the shift.

-------------------------------------------------------------------------------

Addendum: 12/27/18 at 1906 by KERI SOLIS RN

-------------------------------------------------------------------------------

No significant events during this shift will endorse for continuity of care

## 2018-12-27 NOTE — NUR
RN Notes

Patient had no change in condition overnight. Pain was adequately managed with Toradol and x 
1 dose of Dilaudid. Upon pain reassessment, patient had no further complaints of pain. 
Patient had a shower last night, stated she felt much better. Hourly rounding provided. 
Safety/fall precautions implemented and maintained throughout shift. Patient's bed at lowest 
position and call light is within reach. Will endorse to oncoming shift's RN.

## 2018-12-28 VITALS — SYSTOLIC BLOOD PRESSURE: 156 MMHG | HEART RATE: 66 BPM | RESPIRATION RATE: 18 BRPM | DIASTOLIC BLOOD PRESSURE: 89 MMHG

## 2018-12-28 VITALS — DIASTOLIC BLOOD PRESSURE: 80 MMHG | HEART RATE: 75 BPM | SYSTOLIC BLOOD PRESSURE: 152 MMHG | RESPIRATION RATE: 17 BRPM

## 2018-12-28 VITALS — DIASTOLIC BLOOD PRESSURE: 79 MMHG | HEART RATE: 57 BPM | SYSTOLIC BLOOD PRESSURE: 159 MMHG | RESPIRATION RATE: 16 BRPM

## 2018-12-28 VITALS — RESPIRATION RATE: 18 BRPM | HEART RATE: 81 BPM | SYSTOLIC BLOOD PRESSURE: 130 MMHG | DIASTOLIC BLOOD PRESSURE: 83 MMHG

## 2018-12-28 RX ADMIN — PANTOPRAZOLE SODIUM 1 MG: 40 TABLET, DELAYED RELEASE ORAL at 05:44

## 2018-12-28 RX ADMIN — PIPERACILLIN SODIUM AND TAZOBACTAM SODIUM SCH MLS/HR: 3; .375 INJECTION, POWDER, LYOPHILIZED, FOR SOLUTION INTRAVENOUS at 00:17

## 2018-12-28 RX ADMIN — FOLIC ACID SCH MLS/HR: 5 INJECTION, SOLUTION INTRAMUSCULAR; INTRAVENOUS; SUBCUTANEOUS at 17:56

## 2018-12-28 RX ADMIN — HYDROCODONE BITARTRATE AND ACETAMINOPHEN 1 TAB: 5; 325 TABLET ORAL at 20:06

## 2018-12-28 RX ADMIN — PANTOPRAZOLE SODIUM SCH MG: 40 TABLET, DELAYED RELEASE ORAL at 05:44

## 2018-12-28 RX ADMIN — PIPERACILLIN SODIUM AND TAZOBACTAM SODIUM 1 MLS/HR: 3; .375 INJECTION, POWDER, LYOPHILIZED, FOR SOLUTION INTRAVENOUS at 00:17

## 2018-12-28 RX ADMIN — PIPERACILLIN SODIUM AND TAZOBACTAM SODIUM SCH MLS/HR: 3; .375 INJECTION, POWDER, LYOPHILIZED, FOR SOLUTION INTRAVENOUS at 05:44

## 2018-12-28 RX ADMIN — ACETAMINOPHEN 1 MG: 325 TABLET, FILM COATED ORAL at 15:26

## 2018-12-28 RX ADMIN — PIPERACILLIN SODIUM AND TAZOBACTAM SODIUM 1 MLS/HR: 3; .375 INJECTION, POWDER, LYOPHILIZED, FOR SOLUTION INTRAVENOUS at 11:58

## 2018-12-28 RX ADMIN — ONDANSETRON PRN MG: 4 TABLET, FILM COATED ORAL at 08:58

## 2018-12-28 RX ADMIN — THIAMINE HYDROCHLORIDE 1 MLS/HR: 100 INJECTION, SOLUTION INTRAMUSCULAR; INTRAVENOUS at 02:00

## 2018-12-28 RX ADMIN — THIAMINE HYDROCHLORIDE 1 MLS/HR: 100 INJECTION, SOLUTION INTRAMUSCULAR; INTRAVENOUS at 17:56

## 2018-12-28 RX ADMIN — HYDROCODONE BITARTRATE AND ACETAMINOPHEN PRN TAB: 5; 325 TABLET ORAL at 20:06

## 2018-12-28 RX ADMIN — FOLIC ACID SCH MLS/HR: 5 INJECTION, SOLUTION INTRAMUSCULAR; INTRAVENOUS; SUBCUTANEOUS at 00:22

## 2018-12-28 RX ADMIN — ONDANSETRON PRN MG: 4 TABLET, FILM COATED ORAL at 00:22

## 2018-12-28 RX ADMIN — LORATADINE, PSEUDOEPHEDRINE SULFATE 1 TAB: 5; 120 TABLET, FILM COATED, EXTENDED RELEASE ORAL at 08:54

## 2018-12-28 RX ADMIN — ONDANSETRON HYDROCHLORIDE 1 MG: 4 TABLET, FILM COATED ORAL at 08:58

## 2018-12-28 RX ADMIN — PIPERACILLIN SODIUM AND TAZOBACTAM SODIUM 1 MLS/HR: 3; .375 INJECTION, POWDER, LYOPHILIZED, FOR SOLUTION INTRAVENOUS at 23:27

## 2018-12-28 RX ADMIN — FOLIC ACID SCH MLS/HR: 5 INJECTION, SOLUTION INTRAMUSCULAR; INTRAVENOUS; SUBCUTANEOUS at 02:00

## 2018-12-28 RX ADMIN — PIPERACILLIN SODIUM AND TAZOBACTAM SODIUM 1 MLS/HR: 3; .375 INJECTION, POWDER, LYOPHILIZED, FOR SOLUTION INTRAVENOUS at 05:44

## 2018-12-28 RX ADMIN — PIPERACILLIN SODIUM AND TAZOBACTAM SODIUM SCH MLS/HR: 3; .375 INJECTION, POWDER, LYOPHILIZED, FOR SOLUTION INTRAVENOUS at 17:55

## 2018-12-28 RX ADMIN — FOLIC ACID SCH MLS/HR: 5 INJECTION, SOLUTION INTRAMUSCULAR; INTRAVENOUS; SUBCUTANEOUS at 14:30

## 2018-12-28 RX ADMIN — THIAMINE HYDROCHLORIDE 1 MLS/HR: 100 INJECTION, SOLUTION INTRAMUSCULAR; INTRAVENOUS at 14:30

## 2018-12-28 RX ADMIN — FLUTICASONE PROPIONATE 1 SPRAY: 50 SPRAY, METERED NASAL at 08:54

## 2018-12-28 RX ADMIN — PIPERACILLIN SODIUM AND TAZOBACTAM SODIUM SCH MLS/HR: 3; .375 INJECTION, POWDER, LYOPHILIZED, FOR SOLUTION INTRAVENOUS at 11:58

## 2018-12-28 RX ADMIN — THIAMINE HYDROCHLORIDE 1 MLS/HR: 100 INJECTION, SOLUTION INTRAMUSCULAR; INTRAVENOUS at 00:22

## 2018-12-28 RX ADMIN — FLUTICASONE PROPIONATE 1 SPRAY: 50 SPRAY, METERED NASAL at 20:05

## 2018-12-28 RX ADMIN — PIPERACILLIN SODIUM AND TAZOBACTAM SODIUM SCH MLS/HR: 3; .375 INJECTION, POWDER, LYOPHILIZED, FOR SOLUTION INTRAVENOUS at 23:27

## 2018-12-28 RX ADMIN — PIPERACILLIN SODIUM AND TAZOBACTAM SODIUM 1 MLS/HR: 3; .375 INJECTION, POWDER, LYOPHILIZED, FOR SOLUTION INTRAVENOUS at 17:55

## 2018-12-28 RX ADMIN — LORATADINE, PSEUDOEPHEDRINE SULFATE 1 TAB: 5; 120 TABLET, FILM COATED, EXTENDED RELEASE ORAL at 20:05

## 2018-12-28 RX ADMIN — ONDANSETRON HYDROCHLORIDE 1 MG: 2 INJECTION, SOLUTION INTRAMUSCULAR; INTRAVENOUS at 11:58

## 2018-12-28 RX ADMIN — ONDANSETRON HYDROCHLORIDE 1 MG: 4 TABLET, FILM COATED ORAL at 00:22

## 2018-12-28 NOTE — NUR
RN NOTES



Patient complained of being nauseated, Zofran 4 mg tablet given.  Patient was just seen by 
dr Moses with an order for discharge. patient denies any pain upon assessment.  MD was made 
aware of the nausea and as per MD patient is okay to be dc'd with home medication but she 
can hold the discharge if nausea worsens.

## 2018-12-28 NOTE — CONS
Date/Time of Note


Date/Time of Note


DATE: 12/28/18 


TIME: 12:16





Assessment/Plan


Assessment/Plan


Hospital Course


+ nausea per reprt, no other changes, looks comfortable, no fevers





Microbiology: Blood and urine culture on December 23 grew E. coli, repeat blood 


cultures negative





Antimicrobials: Zosyn





CT of the abdomen and pelvis revealed possible cholecystitis.   HIDA scan 


negative





Physical examination: Well-developed obese middle-aged  woman who is 


awake in no distress.  Head atraumatic normocephalic.  Neck is supple chest rise


symmetrical breath sounds clear.  Heart: S1-S2.  Abdomen soft patient has te


nderness over the right upper quadrant bowel sounds present extremities without 


cyanosis





Assessment:


1.  E. coli bacteremia secondary to urinary tract infection


2.  RUQ abdominal pain


3.  Headache ?sinusitis per CT





Plan: Clinically stable, change abx to oral Levaquin 10 more days


Result Diagram:  


12/27/18 0616                                                                   


            12/27/18 0616








Consultation Date/Type/Reason


Admit Date/Time


Dec 25, 2018 at 13:37


Initial Consult Date





Type of Consult


id





Exam/Review of Systems


Vital Signs


Vitals





Vital Signs


  Date      Temp  Pulse  Resp  B/P (MAP)   Pulse Ox  O2          O2 Flow    FiO2


Time                                                 Delivery    Rate


  12/28/18  98.4     66    18      156/89        94  Room Air


     07:45                          (111)








Intake and Output





12/27/18 12/27/18 12/28/18





1515:00


23:00


07:00





IntakeIntake Total


720 ml


1170 ml


1040 ml





OutputOutput Total


2620 ml





BalanceBalance


720 ml


-1450 ml


1040 ml














Medications


Medications





Current Medications


IV Flush (NS 3 ml) 3 ml PER PROTOCOL IV ;  Start 12/24/18 at 20:00


Ondansetron HCl (Zofran Tab) 4 mg Q6H  PRN PO NAUSEA AND/OR VOMITING Last 


administered on 12/28/18at 08:58; Admin Dose 4 MG;  Start 12/24/18 at 20:00


Acetaminophen (Tylenol Tab) 650 mg Q6H  PRN PO PAIN LEVEL 1-3 OR FEVER Last 


administered on 12/26/18at 12:03; Admin Dose 650 MG;  Start 12/24/18 at 20:00


Acetaminophen/ Hydrocodone Bitart (Norco (5/325)) 1 tab Q6H  PRN PO MODERATE 


PAIN LEVEL 4-6 Last administered on 12/25/18at 04:38; Admin Dose 1 TAB;  Start 1


2/24/18 at 20:00


Docusate Sodium (Colace) 100 mg Q12H  PRN PO CONSTIPATION;  Start 12/24/18 at 


20:00


Bisacodyl (Dulcolax) 5 mg DAILY  PRN PO CONSTIPATION;  Start 12/24/18 at 20:00


Hydromorphone HCl (Dilaudid) 0.5 mg Q4H  PRN IV SEVERE PAIN LEVEL 7-10 Last 


administered on 12/27/18at 10:14; Admin Dose 0.5 MG;  Start 12/25/18 at 00:30


Piperacillin Sod/ Tazobactam Sod 100 ml @  200 mls/hr Q6 IVPB  Last administered


on 12/28/18at 11:58; Admin Dose 200 MLS/HR;  Start 12/25/18 at 03:30


Pantoprazole (Protonix Tab) 40 mg DAILY@06 PO  Last administered on 12/28/18at 


05:44; Admin Dose 40 MG;  Start 12/26/18 at 06:00


Sodium Chloride 1,000 ml @  80 mls/hr [id number] IV  Last administered on 


12/28/18at 00:22; Admin Dose 80 MLS/HR;  Start 12/26/18 at 00:00


Acetaminophen/ Butalbital/ Caffeine (Fioricet) 1 tab Q4H  PRN PO PAIN Last 


administered on 12/27/18at 14:02; Admin Dose 1 TAB;  Start 12/27/18 at 12:00


Loratadine/ Pseudoephedrine Sulfate (Claritin-D 12 Hr) 1 tab Q12 PO  Last 


administered on 12/28/18at 08:54; Admin Dose 1 TAB;  Start 12/27/18 at 15:30


Fluticasone Propionate (Flonase 0.05% Nasal) 1 spray BID NASAL  Last 


administered on 12/28/18at 08:54; Admin Dose 1 SPRAY;  Start 12/27/18 at 15:30


Simethicone (Mylicon) 80 mg Q6H  PRN GTB DISTENSION/GAS/BLOATING Last 


administered on 12/28/18at 11:58; Admin Dose 80 MG;  Start 12/28/18 at 11:30











CASSIE GRAF NP            Dec 28, 2018 12:18

## 2018-12-28 NOTE — PN
Date/Time of Note


Date/Time of Note


DATE: 12/28/18 


TIME: 10:42





Assessment/Plan


VTE Prophylaxis


Risk score (from Ns)>0 risk:  2


SCD applied (from Ns):  Yes


Pharmacological prophylaxis:  LMWH





Lines/Catheters


IV Catheter Type (from Nrsg):  Peripheral IV


Urinary Cath still in place:  No





Assessment/Plan


Assessment/Plan


1. Acute sinusitis


- CT head results noted


- Started on Claritin and flonase with improvement in headache 


- Denies any visual changes





2.  Right flank pain- improving


- UA shows leuk esterase.  Urine cx negative but expected since given 


antibiotics prior to cx being obtained


- continue monitoring for improvement


- IV antibiotics on board and recommending d/c on Cipro for 10 more days


- ID on board as well





3.  ?Acute cholecystitis


- US and CT scan results noted


- HIDA reviewed by Surgery and negative.  Will not proceed with surgical 


intervention at this time.  


- Discussed finding with patient and need to continue antibiotics and outpatient


monitoring


- LFT within normal limits





4.  Sepsis secondary to Bacteremia- resolving


- repeat blood cultures negative to date


- initial blood cx growing gram neg


- ID consultation appreciated





5.  Disposition


- Medically stable for discharge home today


Result Diagram:  


12/27/18 0616                                                                   


            12/27/18 0616








Subjective


24 Hr Interval Summary


Free Text/Dictation


Patient feeling better but still with some nausea.  States Claritin has been 


helping relieve her headache symptoms.  No acute overnight events.





Exam/Review of Systems


Vital Signs


Vitals





Vital Signs


  Date      Temp  Pulse  Resp  B/P (MAP)   Pulse Ox  O2          O2 Flow    FiO2


Time                                                 Delivery    Rate


  12/28/18  98.4     66    18      156/89        94  Room Air


     07:45                          (111)








Intake and Output





12/27/18 12/27/18 12/28/18





1515:00


23:00


07:00





IntakeIntake Total


720 ml


1170 ml


1040 ml





OutputOutput Total


2620 ml





BalanceBalance


720 ml


-1450 ml


1040 ml














Exam


General: no acute distress. answering questions appropriately


Chest: Nontender


Lungs: Clear to auscultation bilaterally no crackles rales or wheezing


Heart: Normal S1-S2, Regular rhythm and rate. No murmurs


Abdomen: soft , nontender to palpation all quadrants, nondistended , bowel 


sounds are present. No guarding no rebound tenderness ,


Extremities: Normal to inspection, no edema no cyanosis





Medications


Medications





Current Medications


IV Flush (NS 3 ml) 3 ml PER PROTOCOL IV ;  Start 12/24/18 at 20:00


Ondansetron HCl (Zofran Tab) 4 mg Q6H  PRN PO NAUSEA AND/OR VOMITING Last 


administered on 12/28/18at 08:58; Admin Dose 4 MG;  Start 12/24/18 at 20:00


Acetaminophen (Tylenol Tab) 650 mg Q6H  PRN PO PAIN LEVEL 1-3 OR FEVER Last 


administered on 12/26/18at 12:03; Admin Dose 650 MG;  Start 12/24/18 at 20:00


Acetaminophen/ Hydrocodone Bitart (Norco (5/325)) 1 tab Q6H  PRN PO MODERATE 


PAIN LEVEL 4-6 Last administered on 12/25/18at 04:38; Admin Dose 1 TAB;  Start 


12/24/18 at 20:00


Docusate Sodium (Colace) 100 mg Q12H  PRN PO CONSTIPATION;  Start 12/24/18 at 


20:00


Bisacodyl (Dulcolax) 5 mg DAILY  PRN PO CONSTIPATION;  Start 12/24/18 at 20:00


Hydromorphone HCl (Dilaudid) 0.5 mg Q4H  PRN IV SEVERE PAIN LEVEL 7-10 Last 


administered on 12/27/18at 10:14; Admin Dose 0.5 MG;  Start 12/25/18 at 00:30


Piperacillin Sod/ Tazobactam Sod 100 ml @  200 mls/hr Q6 IVPB  Last administered


on 12/28/18at 05:44; Admin Dose 200 MLS/HR;  Start 12/25/18 at 03:30


Pantoprazole (Protonix Tab) 40 mg DAILY@06 PO  Last administered on 12/28/18at 


05:44; Admin Dose 40 MG;  Start 12/26/18 at 06:00


Sodium Chloride 1,000 ml @  80 mls/hr [id number] IV  Last administered on 


12/28/18at 00:22; Admin Dose 80 MLS/HR;  Start 12/26/18 at 00:00


Acetaminophen/ Butalbital/ Caffeine (Fioricet) 1 tab Q4H  PRN PO PAIN Last adm


inistered on 12/27/18at 14:02; Admin Dose 1 TAB;  Start 12/27/18 at 12:00


Loratadine/ Pseudoephedrine Sulfate (Claritin-D 12 Hr) 1 tab Q12 PO  Last 


administered on 12/28/18at 08:54; Admin Dose 1 TAB;  Start 12/27/18 at 15:30


Fluticasone Propionate (Flonase 0.05% Nasal) 1 spray BID NASAL  Last admini


stered on 12/28/18at 08:54; Admin Dose 1 SPRAY;  Start 12/27/18 at 15:30











MITCHELL KRAUSE MD                 Dec 28, 2018 10:45

## 2018-12-28 NOTE — PDOCDIS
Discharge Instructions


DIAGNOSIS


Discharge Diagnosis


1. Acute sinusitis


2.  Right flank pain- improving


3.  ?Acute cholecystitis


4.  Sepsis secondary to Bacteremia





CONDITION


                [id number]
Patient Condition:  [id number]
Stable








HOME CARE INSTRUCTIONS:


         [id number]
Diet Instructions:  [id number]
Low Fat /Cholesterol








FOLLOW UP/APPOINTMENTS


Follow-up Plan


1.  Follow up with your primary care physician in 1 week


2.  Avoid fatty, greasy, heavy foods that will irritate your stomach and 


gallbladder


3. Continue Cipro 500mg twice a day for 10 more days, with next dose tomorrow


4. Continue using Flonase daily and allergy medication until sinusitis symptoms 


resolved


5. If symptoms return, please go to your closest emergency department











MITCHELL KRAUSE MD                 Dec 28, 2018 10:50

## 2018-12-28 NOTE — NUR
RN NOTES



Patient has no significant change but as per patient she still feels so weak and she feels 
that she is going to have a fever. Temperature 99, Dr Moses was made aware and gave an 
order to hold DC today. Patient was also given Zofran 4 mg IV x 1 and Mylicon 80 mg tab 
every 6 hours PRN.  Still on Zosyn every 6 hours. Patient denies pain every assessment. Will 
endorse to night shift for continuity of care.

## 2018-12-29 VITALS — HEART RATE: 70 BPM | RESPIRATION RATE: 18 BRPM | DIASTOLIC BLOOD PRESSURE: 73 MMHG | SYSTOLIC BLOOD PRESSURE: 138 MMHG

## 2018-12-29 VITALS — DIASTOLIC BLOOD PRESSURE: 94 MMHG | SYSTOLIC BLOOD PRESSURE: 168 MMHG | RESPIRATION RATE: 18 BRPM | HEART RATE: 83 BPM

## 2018-12-29 RX ADMIN — LORATADINE, PSEUDOEPHEDRINE SULFATE 1 TAB: 5; 120 TABLET, FILM COATED, EXTENDED RELEASE ORAL at 08:13

## 2018-12-29 RX ADMIN — PANTOPRAZOLE SODIUM 1 MG: 40 TABLET, DELAYED RELEASE ORAL at 06:15

## 2018-12-29 RX ADMIN — LEVOFLOXACIN 1 MG: 500 TABLET, FILM COATED ORAL at 06:15

## 2018-12-29 RX ADMIN — PIPERACILLIN SODIUM AND TAZOBACTAM SODIUM 1 MLS/HR: 3; .375 INJECTION, POWDER, LYOPHILIZED, FOR SOLUTION INTRAVENOUS at 06:14

## 2018-12-29 RX ADMIN — HYDROCODONE BITARTRATE AND ACETAMINOPHEN 1 TAB: 5; 325 TABLET ORAL at 06:22

## 2018-12-29 RX ADMIN — FLUTICASONE PROPIONATE 1 SPRAY: 50 SPRAY, METERED NASAL at 08:13

## 2018-12-29 RX ADMIN — BENZOCAINE, MENTHOL 1 LOZENGE: 15; 3.6 LOZENGE ORAL at 06:19

## 2018-12-29 RX ADMIN — FOLIC ACID SCH MLS/HR: 5 INJECTION, SOLUTION INTRAMUSCULAR; INTRAVENOUS; SUBCUTANEOUS at 08:13

## 2018-12-29 RX ADMIN — HYDROCODONE BITARTRATE AND ACETAMINOPHEN PRN TAB: 5; 325 TABLET ORAL at 06:22

## 2018-12-29 RX ADMIN — THIAMINE HYDROCHLORIDE 1 MLS/HR: 100 INJECTION, SOLUTION INTRAMUSCULAR; INTRAVENOUS at 08:13

## 2018-12-29 RX ADMIN — PANTOPRAZOLE SODIUM SCH MG: 40 TABLET, DELAYED RELEASE ORAL at 06:15

## 2018-12-29 RX ADMIN — PIPERACILLIN SODIUM AND TAZOBACTAM SODIUM SCH MLS/HR: 3; .375 INJECTION, POWDER, LYOPHILIZED, FOR SOLUTION INTRAVENOUS at 06:14

## 2018-12-29 NOTE — NUR
PT IS A.O X4. C/O OF HEADACHE AND RIGHT FLANK PAIN, MEDICATED WITH NORCO, GOOD RELIEF. NO 
DISTRESS NOTED. AFEBRIL.NO NAUSEA NOTED. ALL DUE MEDS GIVEN. NEEDS ATTENDED. HOURLY ROUNDING 
MADE. CALL LIGHT AND TABLE ARE WITHIN REACH. C/O OF SORE THROAT, DR BAEZ NOTIFIED. NEW 
ORDER NOTED AND CARRIED OUT.

## 2018-12-29 NOTE — NUR
Discharge patient at this time. Discharge teaching given by charge nurse Abeba HUBBARD. Pt 
verbalize understanding. Discharge with all of her belongings. Respiration are even and non 
labored. NO acute distress noted. Stable condition.

## 2018-12-29 NOTE — PN
Date/Time of Note


Date/Time of Note


DATE: 12/29/18 


TIME: 09:46





Assessment/Plan


VTE Prophylaxis


Risk score (from Nsg)>0 risk:  2


SCD applied (from Nsg):  Yes


Pharmacological prophylaxis:  LMWH





Lines/Catheters


IV Catheter Type (from Nrsg):  Peripheral IV


Urinary Cath still in place:  No





Assessment/Plan


Assessment/Plan


1. Acute sinusitis


- still with headache but recommended continue on current treatment. Tylenol PRN


for pain


- CT head results noted


- Continue on Claritin and Flonase 


- Denies any visual changes





2.  Right flank pain- stable


- UA shows leuk esterase.  Urine cx negative but expected since given 


antibiotics prior to cx being obtained


- IV antibiotics on board and recommending d/c on Cipro for 10 more days


- ID on board as well





3.  ?Acute cholecystitis


- US and CT scan results noted


- HIDA reviewed by Surgery and negative.  Will not proceed with surgical 


intervention at this time.  


- Discussed finding with patient and need to continue antibiotics and outpatient


monitoring


- LFT within normal limits





4.  Sepsis secondary to Bacteremia- resolving


- repeat blood cultures negative to date


- initial blood cx growing gram neg


- ID consultation appreciated and recommending cipro for 10 more days





5.  Disposition


- Medically stable for discharge home today


Result Diagram:  


12/27/18 0616                                                                   


            12/27/18 0616








Subjective


24 Hr Interval Summary


Free Text/Dictation


Patient states shes feeling better but still with headache this am.  No acute 


overnight events.





Exam/Review of Systems


Vital Signs


Vitals





Vital Signs


  Date      Temp  Pulse  Resp  B/P (MAP)   Pulse Ox  O2          O2 Flow    FiO2


Time                                                 Delivery    Rate


  12/29/18  98.0     70    18      138/73        95  Room Air


     07:40                           (94)








Intake and Output





12/28/18 12/28/18 12/29/18





1515:00


23:00


07:00





IntakeIntake Total


640 ml


500 ml


1080 ml





BalanceBalance


640 ml


500 ml


1080 ml














Exam


General: no acute distress. answering questions appropriately


Chest: Nontender


Lungs: Clear to auscultation bilaterally no crackles rales or wheezing


Heart: Normal S1-S2, Regular rhythm and rate. No murmurs


Abdomen: soft , mildly tender right CVA.  nondistended , bowel sounds are 


present. No guarding no rebound tenderness ,


Extremities: Normal to inspection, no edema no cyanosis





Medications


Medications





Current Medications


IV Flush (NS 3 ml) 3 ml PER PROTOCOL IV ;  Start 12/24/18 at 20:00


Ondansetron HCl (Zofran Tab) 4 mg Q6H  PRN PO NAUSEA AND/OR VOMITING Last 


administered on 12/28/18at 08:58; Admin Dose 4 MG;  Start 12/24/18 at 20:00


Acetaminophen (Tylenol Tab) 650 mg Q6H  PRN PO PAIN LEVEL 1-3 OR FEVER Last 


administered on 12/28/18at 15:26; Admin Dose 650 MG;  Start 12/24/18 at 20:00


Acetaminophen/ Hydrocodone Bitart (Norco (5/325)) 1 tab Q6H  PRN PO MODERATE 


PAIN LEVEL 4-6 Last administered on 12/29/18at 06:22; Admin Dose 1 TAB;  Start 


12/24/18 at 20:00


Docusate Sodium (Colace) 100 mg Q12H  PRN PO CONSTIPATION;  Start 12/24/18 at 


20:00


Bisacodyl (Dulcolax) 5 mg DAILY  PRN PO CONSTIPATION;  Start 12/24/18 at 20:00


Hydromorphone HCl (Dilaudid) 0.5 mg Q4H  PRN IV SEVERE PAIN LEVEL 7-10 Last 


administered on 12/27/18at 10:14; Admin Dose 0.5 MG;  Start 12/25/18 at 00:30


Pantoprazole (Protonix Tab) 40 mg DAILY@06 PO  Last administered on 12/29/18at 


06:15; Admin Dose 40 MG;  Start 12/26/18 at 06:00


Sodium Chloride 1,000 ml @  80 mls/hr [id number] IV  Last administered on 


12/29/18at 08:13; Admin Dose 80 MLS/HR;  Start 12/26/18 at 00:00


Acetaminophen/ Butalbital/ Caffeine (Fioricet) 1 tab Q4H  PRN PO PAIN Last 


administered on 12/27/18at 14:02; Admin Dose 1 TAB;  Start 12/27/18 at 12:00


Loratadine/ Pseudoephedrine Sulfate (Claritin-D 12 Hr) 1 tab Q12 PO  Last 


administered on 12/29/18at 08:13; Admin Dose 1 TAB;  Start 12/27/18 at 15:30


Fluticasone Propionate (Flonase 0.05% Nasal) 1 spray BID NASAL  Last 


administered on 12/29/18at 08:13; Admin Dose 1 SPRAY;  Start 12/27/18 at 15:30


Simethicone (Mylicon) 80 mg Q6H  PRN GTB DISTENSION/GAS/BLOATING Last 


administered on 12/28/18at 11:58; Admin Dose 80 MG;  Start 12/28/18 at 11:30


Levofloxacin (Levaquin) 500 mg DAILY@06 PO  Last administered on 12/29/18at 


06:15; Admin Dose 500 MG;  Start 12/29/18 at 06:00


Phenol (Cepastat Lozenge) 1 lozenge Q1H  PRN MT SORE THROAT Last administered on


12/29/18at 06:19; Admin Dose 1 LOZENGE;  Start 12/29/18 at 02:00











MITCHELL KRAUSE MD                 Dec 29, 2018 09:49

## 2018-12-29 NOTE — DS
Date/Time of Note


Date/Time of Note


DATE: 12/29/18 


TIME: 17:03





Discharge Summary


Admission/Discharge Info


Admit Date/Time


Dec 25, 2018 at 13:37


Discharge Date/Time


Dec 29, 2018 at 12:00


Discharge Diagnosis


1. Acute sinusitis


2.  Right flank pain- improving


3.  ?Acute cholecystitis


4.  Sepsis secondary to Bacteremia


Patient Condition:  Stable


Consults


Infectious disease


Procedures


PROCEDURE:   CT Brain with and without contrast. 


 


CLINICAL INDICATION:   Persistent headache.


 


TECHNIQUE:   A CT of the brain with and without contrast was performed utilizing


axial sections from the skull base through the vertex. 80 cc of Omnipaque-300 


was used for the intravenous contrast.  One or more the following does reduction


techniques were utilized: Automated exposure control, adjustment of the mA/ or 


kV according to patient's size, or use of iterative reconstruction technique.  


Total exam CTDIvol is 39.25 x2 MGy and DLP is 1268.46 mGy-cm.  


DICOM images are available.


 


COMPARISON:  None available.


 


FINDINGS:


 


The ventricles and sulci are age-appropriate. There is no intracranial 


hemorrhage, mass effect or midline shift.  No abnormal intra-axial or extra-


axial fluid collections are seen. The gray/white matter differentiation is well 


preserved. No intracranial enhancing abnormality is noted.


Small dilated perivascular space versus old lacunar infarct is noted in the 


right lentiform nucleus. 


 


No acute skull abnormality is noted. The visualized paranasal sinuses 


demonstrate mild scattered mucosal thickening with small fluid level in the 


right sphenoid sinus.


 


IMPRESSION:


 


1.  No acute intracranial hemorrhage, transcortical infarction or mass effect. 


No intracranial enhancing abnormality.


 


2.  Small dilated perivascular space versus old lacunar infarct in the right 


lentiform nucleus. 


 


3.  Mild scattered paranasal sinus disease with small fluid level in the right 


sphenoid sinus, correlate for acute sinusitis.


 


 


RPTAT: TT


_____________________________________________ 


.Antoinette Sharif MD, MD           Date    Time 


Electronically viewed and signed by .Antoinette Sharif MD, MD on 12/27/2018 15:13








AMENDMENT:


12/26/2018 4:07:52 PM Sarah Cruz Md


Additional delayed images of the abdomen were obtained at 5 hours post 


injection, which demonstrate a visualization of the gastrointestinal activity.


 


There is no scintigraphic evidence to suggest the presence of a common bile duct


obstruction.


 


PROCEDURE:   HIDA scan


 


CLINICAL INDICATION:   43 -year-old patient with abdominal pain. 


 


TECHNIQUE:   Following the intravenous injection of 8.7 mCi of Tc-99m 


Mebrofenin, multiple anterior dynamic images of the abdomen along with numerous 


planar spot images of the abdomen were obtained up to 65 minutes post injection.





 


COMPARISON:   No prior HIDA scans.  


 


FINDINGS:


 


The liver is promptly visualized, demonstrates homogeneous distribution of 


radionuclide.


 


There is a visualization of the common bile duct gallbladder within normal time.


 


Images of the abdomen obtained up to 65 minutes post injection do not reveal 


bowel uptake.


 


IMPRESSION:


 


 


1.  No evidence of a cystic duct obstruction.


 


 


2.  Visualization of the gastrointestinal activity up to 65 minutes post 


injection.


 


Delayed images to follow to clarify the finding.


 


RPTAT: HH


_____________________________________________ 


.Sarah Cruz MD, MD           Date    Time 


Electronically viewed and signed by .Sarah Cruz MD, MD on 12/26/2018 16:07 





PROCEDURE:


  CT Abdomen and Pelvis Without Intravenous Contrast


 


CLINICAL INDICATION:


  Abdominal pain


 


TECHNIQUE:


  Axial computed tomography images of the abdomen and pelvis without intravenous


contrast.  Sagittal and coronal reformatted images were created and reviewed.  


CTDIvol (mGy) = <21.16 mGy>; total DLP (mGy-cm) = <1261.71 mGy.cm>  This CT exam


was performed using one or more of the following dose reduction techniques:  


automated exposure control, adjustment of the mA and/or kV according to patient 


size, and/or use of iterative reconstruction technique.  DICOM images are 


available.


 


COMPARISON:


  06/02/2016


 


FINDINGS:


  LUNG BASES:  Right greater than left basilar atelectasis is seen.


  PLEURAL SPACE:  No pleural effusions are seen.


 


 ABDOMEN:


  LIVER:  Unremarkable


  GALLBLADDER AND BILE DUCTS:  The gallbladder is slightly distended and there 


is a suggestion of some wall thickening or pericholecystic fluid. No definite 


radiopaque stones. No biliary ductal dilatation or visible choledocholithiasis.


  PANCREAS:  Unremarkable  No ductal dilation.


  SPLEEN:  Unremarkable  No splenomegaly.


  ADRENALS:  Unremarkable  No mass.


  KIDNEYS AND URETERS:  Unremarkable  No hydronephrosis or renal calculi are 


seen.


  STOMACH AND BOWEL:  Few colonic diverticula. No evidence for diverticulitis. 


No evidence for small bowel obstruction, free air, or abscess.


 


 PELVIS:


  APPENDIX:  Suture material is seen along the medial cecum from prior 


appendectomy.


  BLADDER:  Unremarkable  No stones.


  REPRODUCTIVE:  Grossly unremarkable uterus and adnexa.


 


 ABDOMEN and PELVIS:


  INTRAPERITONEAL SPACE:  See above.


  BONES/JOINTS:  Mild degenerative change of the spine. Large L4-5 disc bulge.


  SOFT TISSUES:  Small fat-containing umbilical hernia.


  VASCULATURE:  Unremarkable  No abdominal aortic aneurysm.


  LYMPH NODES:  Unremarkable  No enlarged lymph nodes.


  OTHER FINDINGS:   


 


IMPRESSION:     


 


1.  Possible cholecystitis. Correlate with ultrasound and/or HIDA scan as 


appropriate.


 


2.  Few colonic diverticula. No evidence for diverticulitis. No evidence for 


small bowel obstruction, free air, or abscess.


 


 


 


RPTAT: HLBE


_____________________________________________ 


Physician Miguel A           Date    Time 


Electronically viewed and signed by Physician Miguel A on 12/25/2018 


02:46 


 





PROCEDURE:   Portable chest x-ray. 


 


CLINICAL INDICATION: 43 years of age,  female. Right flank pain. UTI.


 


TECHNIQUE:   Portable AP view of the chest. 


 


COMPARISON:  CT abdomen pelvis June 2, 2016


 


FINDINGS:


 


Medical devices:  None.


 


Mediastinum:  Cardiomediastinal contours are normal.  


 


Lungs:  There is mild elevation of the right diaphragm that is unchanged from 


the prior CT. There is nonspecific mild increased opacity at the right greater 


than left lung bases. Lungs are otherwise clear.


 


Pleura:  Negative for pleural effusion or pneumothorax.


 


Bones:  No acute bony abnormality.   


 


Additional comment:  None.


 


IMPRESSION:


 


1. Mild elevation of the right diaphragm is unchanged since June 2, 2016. 


 


2. Nonspecific opacity at the right greater than left lung bases may be due to 


atelectasis, aspiration or pneumonia.


 


 


RPTAT: HCTS


_____________________________________________ 


Physician Hemanth           Date    Time 


Electronically viewed and signed by Physician Hemanth on 12/25/2018 01


:25 





PROCEDURE:   US Abdomen limited. 


 


CLINICAL INDICATION:  Abdominal pain


 


TECHNIQUE:   Gray scale and color Doppler imaging of the right upper quadrant


 


COMPARISON:   CT 12/25/2018; CT 6/2/2016


 


FINDINGS: 


Liver appears enlarged with hepatic span measuring 21.4 cm. The visualized liver


is homogeneous without discrete hepatic lesion seen. There is appropriately 


directed flow within the main portal vein


 


The gallbladder is fluid-filled without stone present. There is mild gallbladder


wall thickening measuring 4 mm and trace pericholecystic fluid is seen between 


the gallbladder and liver.


 


No intra or extrahepatic biliary dilatation is seen.  The common bile duct 


measures 4.8 mm in maximal dimension.   


 


The visualized pancreas is uniform in appearance. Distal pancreasis partially 


obscured by bowel gas. 


 


The right kidney measures 12.5 cm.  No hydronephrosis or renal calculi are seen.


 


IMPRESSION:


There is mild gallbladder wall thickening and trace pericholecystic free fluid, 


that are nonspecific and may represent the presence of ongoing acute 


cholecystitis in the appropriate setting. Unknown sonographic Wise's sign. No 


gallstone or biliary ductal dilatation is seen.


 


Correlate clinically. Further evaluation could include nuclear medicine HIDA sc


an as clinically warranted.


 


Results were called to Gaurav Acevedo at 12/25/2018 4:21 AM


 


RPTAT: HSAF


_____________________________________________ 


Physician Susana           Date    Time 


Electronically viewed and signed by Physician Susana on 12/25/2018 


04:22


Hx of Present Illness


Chief complaint: Fever, flank pain, positive blood culture





This is a 43 year old female who was diagnosed with the last 24 hours of urinary


tract infection and was given IV Rocephin and sent home on ciprofloxacin.  


Patient was called back to [facility] after she had a positive


blood culture with gram-negative rods.  Patient at the current time reports 


headache as well as fevers.  She states that she continues to have right flank 


pain.  She denies any chest pain.  She does report urinary frequency.  Upon 


further questioning the patient does report that her right-sided flank pain has 


been on and off for the last few months.





Allergies: NKDA





Medications: None


Hospital Course


Patient was complaining of right upper quadrant pain as well as right sided 


flank pain.  She was worked up for acute cholecystitis and due to no presence of


gallstones and negative HIDA scan, discussion was held with 2 different surgeons


who recommended no surgical intervention.  Infectious disease was consulted 


given bacteremia and antibiotic recommendations were made.  Patient continued 


with headaches and CT scan revealed acute sinusitis.  She was treated with 


antihistamine and decongestant with improvement.  Patient was tolerating PO 


intake with some bloating relieved by simethicone.  Patients presenting symptoms


improved and on day of discharge, vitals and physical exam were stable.  Patient


was discharged home in stable condition.


Home Meds


Active Scripts


Hydrocodone Bit-Acetaminophen (Hydrocodone Bit-APAP) 5-325MG Tablet, 1 TAB PO 


Q6H PRN for MODERATE PAIN LEVEL 4-6 for 7 Days, #28 TAB


   Prov:MITCHELL KRAUSE MD         12/29/18


Simethicone (GAS RELIEF) 80 Mg Tab.chew, 80 MG PO Q6 PRN for 


DISTENSION/GAS/BLOATING for 30 Days, #120 TAB.CHEW


   Prov:MITCHELL KRAUSE MD         12/28/18


Ondansetron (Ondansetron Odt) 4 Mg Tab.rapdis, 4 MG PO Q6H PRN for NAUSEA AND/OR


VOMITING for 7 Days, #30 TAB


   Prov:MITCHELL KRAUSE MD         12/28/18


Fluticasone Propionate* (Fluticasone Propionate* Nasal) 50 Mcg/Spray - 16 Gm 


Groom.susp, 1 SPRAY NASAL BID for 14 Days, #1 BOT 6 Refills


   Prov:MITCHELL KRAUSE MD         12/28/18


Loratadine/Pseudoephedrine (Alavert D-12 Allergy-Sinus Tab) 1 Each Tab.er.12h, 1


TAB PO Q12 for 7 Days, #14 TAB


   Prov:MITCHELL KRAUSE MD         12/28/18


Ciprofloxacin Hcl* (Ciprofloxacin Hcl*) 500 Mg Tablet, 500 MG PO BID for 10 


Days, #20 TAB


   Prov:MITCHELL KRAUSE MD         12/28/18


Ibuprofen* (Motrin*) 600 Mg Tab, 600 MG PO Q6, #20 TAB


   Prov:ERICA KAUR MD         12/24/18


Discontinued Scripts


Hydrocodone Bit/Acetaminophen (Anexsia 5-325 Mg Tablet) 1 Tab Tablet, 2 TAB PO 


Q4 PRN for PAIN LEVEL 4-7, #20 TAB


   Prov:LEONARDO SAL NP         6/3/16


Follow-up Plan


1.  Follow up with your primary care physician in 1 week


2.  Avoid fatty, greasy, heavy foods that will irritate your stomach and 


gallbladder


3. Continue Cipro 500mg twice a day for 10 more days, with next dose tomorrow


4. Continue using Flonase daily and allergy medication until sinusitis symptoms 


resolved


5. If symptoms return, please go to your closest emergency department


Primary Care Provider


Care Physician No Primary


Time spent on discharge:   > 30 minutes











MITCHELL KRAUSE MD                 Dec 29, 2018 17:03

## 2018-12-29 NOTE — NUR
Discharge instructions given per MD orders, instructed on medications to continue and side 
effects. Patient verbalized understanding of instructions. All belongings taken. Saline lock 
to RUE discontinued, catheter tip intact.

## 2024-02-28 NOTE — CONS
DATE OF ADMISSION: 12/24/2018

DATE OF CONSULTATION:  12/24/2018

 

 

 

TYPE OF CONSULTATION:  Infectious disease.

 

REASON FOR CONSULTATION:  Antibiotic management.

 

HISTORY OF PRESENT ILLNESS:  Karmen Plunkett is a 43-year-old female admitted on 12/24/2018 with right-
sided pyelonephritis and is being seen for antibiotic management.  The patient presents with right CV
A tenderness.  She was seen in the Emergency Room on the day prior to her admission.  As noted, she p
resents with right CVA tenderness.  She was found to have gram-negative sepsis, was given ceftriaxone
 in the Emergency Room and was continued on ceftriaxone 2 grams q.24h.  The patient has increasing pa
in intermittently for the last few months.  A CT scan of the abdomen and pelvis was ordered without c
ontrast.  As noted, she has Gram-negative rishabh bacteremia.  She is obese, white count is 9.3, H and H 
of 10.8 and 32.7, platelet count 131,000 on the 24th, BUN and creatinine 6/0.63.  Today, white count 
7.2, BUN and creatinine 5/0.65.  Microbiology is pending.  A CT scan of the abdomen and pelvis shows 
possible cholecystitis.  Correlate with ultrasound and/or HIDA scan as appropriate.  According to the
 CT scan, there is slightly distended gallbladder and a suggestion of some wall thickening or pericho
lecystic fluid.  No definite radiopaque stone, no biliary ductal dilatation or visible choledocholith
iasis.  Pancreas is unremarkable.  No ductal dilatation, no splenomegaly, no masses in the adrenals, 
no hydronephrosis or renal calculi.  She has few colonic diverticula.  No evidence for diverticulitis
.  She had a prior appendectomy.  The patient has been switched to Zosyn from ceftriaxone.

 

PAST MEDICAL HISTORY:  

Operations:  Status post laparoscopic appendectomy.

 

FAMILY HISTORY:  Noncontributory.

 

SOCIAL HISTORY:  She does not smoke, drink or abuse drugs.

 

ALLERGIES:  None to penicillin, sulfa or foods.

 

MEDICATIONS:  Per chart.

 

REVIEW OF SYSTEMS:  As per HPI.

 

PHYSICAL EXAMINATION:

GENERAL:  The patient is awake, responsive, in no acute distress.

VITAL SIGNS:  Stable.  She is afebrile.

SKIN:  Without generalized rash.

HEENT:  Within normal limits.

NECK:  Supple.

LYMPH NODES:  None palpable.

CHEST:  Decreased breath sounds at the bases.

HEART:  Without murmur or gallop.

ABDOMEN:  Soft, nontender except in the right upper and lower quadrants without guarding or rebound.

EXTREMITIES:  Without cyanosis, clubbing, or edema.

RECTAL AND GENITAL:  Deferred.

NEUROLOGIC:  No focal neurological abnormality.

 

IMPRESSION AND PLAN:  Karmen Plunkett is a 43-year-old female who may possibly have cholecystitis.  She
 should have surgical evaluation and should also have a HIDA scan.  We will see if we can arrange for
 a HIDA scan today.  I will dictate my findings to the hospitalist.

 

 

Dictated By: JERROLD DREYER MD JD/NORMA

DD:    12/25/2018 13:11:37

DT:    12/25/2018 14:32:52

Conf#: [id number]

DID#:  [id number]

CC: DALI GOTTI MD;*[facility]* PAST SURGICAL HISTORY:  No significant past surgical history